# Patient Record
Sex: FEMALE | Race: WHITE | NOT HISPANIC OR LATINO | Employment: OTHER | URBAN - NONMETROPOLITAN AREA
[De-identification: names, ages, dates, MRNs, and addresses within clinical notes are randomized per-mention and may not be internally consistent; named-entity substitution may affect disease eponyms.]

---

## 2019-01-01 ENCOUNTER — TRANSFERRED RECORDS (OUTPATIENT)
Dept: HEALTH INFORMATION MANAGEMENT | Facility: HOSPITAL | Age: 64
End: 2019-01-01

## 2021-08-31 NOTE — PROGRESS NOTES
"    Assessment & Plan     1. Encounter to establish care  Will obtain records    2. Hypothyroidism due to acquired atrophy of thyroid  - TSH with free T4 reflex; Future    3. Hyperlipidemia LDL goal <100  - Lipid Profile; Future  - Comprehensive metabolic panel; Future    4. Restless legs syndrome  - clonazePAM (KLONOPIN) 0.5 MG tablet; Take 1 tablet (0.5 mg) by mouth nightly as needed (restless legs)  Dispense: 30 tablet; Refill: 3      Return in about 3 months (around 12/1/2021) for thyroid, lipids.    Zoë Romero, NP  [facility] - [place] is a 65 year old who presents for the following health issues     HPI       New Patient/Transfer of Care    No records for review.  States has been on zocor many years, due for labs, tolerating well.      synthroid - also on for several years - dose was adjusted last spring - due for recheck.    Restless legs, take 0.5mg klonopin at bedtime with good relief - due for refill.      She has been taking estradiol and progesterone for severe hot flashes - working well.      Mammogram current, is not sure of vaccines but has had covid vaccine per report.      She has completed release of information, will review notes when received.     Review of Systems   Constitutional, HEENT, cardiovascular, pulmonary, gi and gu systems are negative, except as otherwise noted.      Objective    /66 (BP Location: Right arm, Patient Position: Sitting, Cuff Size: Adult Regular)   Pulse 64   Temp 97.7  F (36.5  C) (Tympanic)   Resp 16   Ht 1.715 m (5' 7.5\")   Wt 58.5 kg (129 lb)   SpO2 98%   BMI 19.91 kg/m    Body mass index is 19.91 kg/m .  Physical Exam   GENERAL: healthy, alert and no distress  NECK: no adenopathy, no asymmetry, masses, or scars, thyroid normal to palpation and no carotid bruits  RESP: lungs clear to auscultation - no rales, rhonchi or wheezes  CV: regular rate and rhythm, normal S1 S2, no S3 or S4, no murmur, click or " rub, no peripheral edema and peripheral pulses strong  MS: no gross musculoskeletal defects noted, no edema  PSYCH: mentation appears normal, affect normal/bright

## 2021-09-01 ENCOUNTER — OFFICE VISIT (OUTPATIENT)
Dept: FAMILY MEDICINE | Facility: OTHER | Age: 66
End: 2021-09-01
Attending: NURSE PRACTITIONER
Payer: COMMERCIAL

## 2021-09-01 VITALS
HEIGHT: 68 IN | DIASTOLIC BLOOD PRESSURE: 66 MMHG | WEIGHT: 129 LBS | OXYGEN SATURATION: 98 % | SYSTOLIC BLOOD PRESSURE: 104 MMHG | HEART RATE: 64 BPM | BODY MASS INDEX: 19.55 KG/M2 | RESPIRATION RATE: 16 BRPM | TEMPERATURE: 97.7 F

## 2021-09-01 DIAGNOSIS — Z76.89 ENCOUNTER TO ESTABLISH CARE: Primary | ICD-10-CM

## 2021-09-01 DIAGNOSIS — G25.81 RESTLESS LEGS SYNDROME: ICD-10-CM

## 2021-09-01 DIAGNOSIS — E03.4 HYPOTHYROIDISM DUE TO ACQUIRED ATROPHY OF THYROID: ICD-10-CM

## 2021-09-01 DIAGNOSIS — N95.1 MENOPAUSAL SYNDROME (HOT FLASHES): ICD-10-CM

## 2021-09-01 DIAGNOSIS — E78.5 HYPERLIPIDEMIA LDL GOAL <100: ICD-10-CM

## 2021-09-01 LAB
ALBUMIN SERPL-MCNC: 3.9 G/DL (ref 3.4–5)
ALP SERPL-CCNC: 50 U/L (ref 40–150)
ALT SERPL W P-5'-P-CCNC: 26 U/L (ref 0–50)
ANION GAP SERPL CALCULATED.3IONS-SCNC: 6 MMOL/L (ref 3–14)
AST SERPL W P-5'-P-CCNC: 19 U/L (ref 0–45)
BILIRUB SERPL-MCNC: 0.7 MG/DL (ref 0.2–1.3)
BUN SERPL-MCNC: 22 MG/DL (ref 7–30)
CALCIUM SERPL-MCNC: 9.3 MG/DL (ref 8.5–10.1)
CHLORIDE BLD-SCNC: 106 MMOL/L (ref 94–109)
CHOLEST SERPL-MCNC: 177 MG/DL
CO2 SERPL-SCNC: 29 MMOL/L (ref 20–32)
CREAT SERPL-MCNC: 1.05 MG/DL (ref 0.52–1.04)
FASTING STATUS PATIENT QL REPORTED: NO
GFR SERPL CREATININE-BSD FRML MDRD: 57 ML/MIN/1.73M2
GLUCOSE BLD-MCNC: 88 MG/DL (ref 70–99)
HDLC SERPL-MCNC: 66 MG/DL
HOLD SPECIMEN: NORMAL
LDLC SERPL CALC-MCNC: 81 MG/DL
NONHDLC SERPL-MCNC: 111 MG/DL
POTASSIUM BLD-SCNC: 3.7 MMOL/L (ref 3.4–5.3)
PROT SERPL-MCNC: 7 G/DL (ref 6.8–8.8)
SODIUM SERPL-SCNC: 141 MMOL/L (ref 133–144)
TRIGL SERPL-MCNC: 150 MG/DL
TSH SERPL DL<=0.005 MIU/L-ACNC: 1.51 MU/L (ref 0.4–4)

## 2021-09-01 PROCEDURE — 80053 COMPREHEN METABOLIC PANEL: CPT | Performed by: NURSE PRACTITIONER

## 2021-09-01 PROCEDURE — 80061 LIPID PANEL: CPT | Performed by: NURSE PRACTITIONER

## 2021-09-01 PROCEDURE — 84443 ASSAY THYROID STIM HORMONE: CPT | Performed by: NURSE PRACTITIONER

## 2021-09-01 PROCEDURE — 99204 OFFICE O/P NEW MOD 45 MIN: CPT | Performed by: NURSE PRACTITIONER

## 2021-09-01 PROCEDURE — 36415 COLL VENOUS BLD VENIPUNCTURE: CPT | Performed by: NURSE PRACTITIONER

## 2021-09-01 RX ORDER — CLONAZEPAM 1 MG/1
TABLET ORAL
COMMUNITY
Start: 2021-03-11 | End: 2021-09-01

## 2021-09-01 RX ORDER — CLONAZEPAM 0.5 MG/1
0.5 TABLET ORAL
Qty: 30 TABLET | Refills: 3 | Status: SHIPPED | OUTPATIENT
Start: 2021-09-01 | End: 2021-12-22

## 2021-09-01 RX ORDER — LEVOTHYROXINE SODIUM 75 UG/1
TABLET ORAL
COMMUNITY
Start: 2021-07-23 | End: 2021-11-24

## 2021-09-01 RX ORDER — SIMVASTATIN 20 MG
TABLET ORAL
COMMUNITY
Start: 2021-08-16 | End: 2022-05-17

## 2021-09-01 RX ORDER — CLOTRIMAZOLE AND BETAMETHASONE DIPROPIONATE 10; .64 MG/G; MG/G
CREAM TOPICAL
COMMUNITY
Start: 2020-10-27

## 2021-09-01 RX ORDER — ESTRADIOL 1 MG/1
TABLET ORAL
COMMUNITY
Start: 2021-08-27 | End: 2021-11-24

## 2021-09-01 RX ORDER — PROGESTERONE 200 MG/1
CAPSULE ORAL
COMMUNITY
Start: 2021-08-27 | End: 2021-11-24

## 2021-09-01 ASSESSMENT — MIFFLIN-ST. JEOR: SCORE: 1180.7

## 2021-09-01 ASSESSMENT — PATIENT HEALTH QUESTIONNAIRE - PHQ9
SUM OF ALL RESPONSES TO PHQ QUESTIONS 1-9: 1
5. POOR APPETITE OR OVEREATING: SEVERAL DAYS

## 2021-09-01 ASSESSMENT — ANXIETY QUESTIONNAIRES
GAD7 TOTAL SCORE: 5
1. FEELING NERVOUS, ANXIOUS, OR ON EDGE: NOT AT ALL
6. BECOMING EASILY ANNOYED OR IRRITABLE: SEVERAL DAYS
7. FEELING AFRAID AS IF SOMETHING AWFUL MIGHT HAPPEN: NOT AT ALL
3. WORRYING TOO MUCH ABOUT DIFFERENT THINGS: SEVERAL DAYS
IF YOU CHECKED OFF ANY PROBLEMS ON THIS QUESTIONNAIRE, HOW DIFFICULT HAVE THESE PROBLEMS MADE IT FOR YOU TO DO YOUR WORK, TAKE CARE OF THINGS AT HOME, OR GET ALONG WITH OTHER PEOPLE: NOT DIFFICULT AT ALL
2. NOT BEING ABLE TO STOP OR CONTROL WORRYING: SEVERAL DAYS
5. BEING SO RESTLESS THAT IT IS HARD TO SIT STILL: SEVERAL DAYS

## 2021-09-01 ASSESSMENT — PAIN SCALES - GENERAL: PAINLEVEL: NO PAIN (0)

## 2021-09-01 NOTE — NURSING NOTE
"Chief Complaint   Patient presents with     Establish Care       Initial /66 (BP Location: Right arm, Patient Position: Sitting, Cuff Size: Adult Regular)   Pulse 64   Temp 97.7  F (36.5  C) (Tympanic)   Resp 16   Ht 1.715 m (5' 7.5\")   Wt 58.5 kg (129 lb)   SpO2 98%   BMI 19.91 kg/m   Estimated body mass index is 19.91 kg/m  as calculated from the following:    Height as of this encounter: 1.715 m (5' 7.5\").    Weight as of this encounter: 58.5 kg (129 lb).  Medication Reconciliation: complete  Shavon Christian LPN    "

## 2021-09-01 NOTE — PATIENT INSTRUCTIONS
Assessment & Plan     1. Encounter to establish care  Will obtain records    2. Hypothyroidism due to acquired atrophy of thyroid  - TSH with free T4 reflex; Future    3. Hyperlipidemia LDL goal <100  - Lipid Profile; Future  - Comprehensive metabolic panel; Future    4. Restless legs syndrome  - clonazePAM (KLONOPIN) 0.5 MG tablet; Take 1 tablet (0.5 mg) by mouth nightly as needed (restless legs)  Dispense: 30 tablet; Refill: 3      Return in about 3 months (around 12/1/2021) for thyroid, lipids.    Zoë Romero, NP  [facility]

## 2021-09-02 ASSESSMENT — ANXIETY QUESTIONNAIRES: GAD7 TOTAL SCORE: 5

## 2021-09-07 ENCOUNTER — DOCUMENTATION ONLY (OUTPATIENT)
Dept: OTHER | Facility: CLINIC | Age: 66
End: 2021-09-07

## 2021-10-17 ENCOUNTER — HEALTH MAINTENANCE LETTER (OUTPATIENT)
Age: 66
End: 2021-10-17

## 2021-11-15 ENCOUNTER — MYC MEDICAL ADVICE (OUTPATIENT)
Dept: FAMILY MEDICINE | Facility: OTHER | Age: 66
End: 2021-11-15
Payer: COMMERCIAL

## 2021-11-22 NOTE — PROGRESS NOTES
Assessment & Plan     1. Hyperlipidemia LDL goal <100  Elevated triglycerides, low fat diet, increase exercise.  Consider fish oil - 2-3 per day.     2. Hypothyroidism due to acquired atrophy of thyroid  - levothyroxine (SYNTHROID/LEVOTHROID) 75 MCG tablet; Take 1 tablet (75 mcg) by mouth daily  Dispense: 90 tablet; Refill: 1    3. Menopausal syndrome (hot flashes)  - estradiol (ESTRACE) 1 MG tablet; Take 1 tablet (1 mg) by mouth daily  Dispense: 90 tablet; Refill: 1  - progesterone (PROMETRIUM) 200 MG capsule; Take 1 capsule (200 mg) by mouth daily  Dispense: 90 capsule; Refill: 1    4. Cervicalgia  - MR Cervical Spine w/o Contrast; Future      Review of the result(s) of each unique test - TSH, CMP, Lipids        No follow-ups on file.    Zoë Romero NP  [facility] - STEPAN Cisenros is a 65 year old who presents for the following health issues     HPI       Patient would like a skin check today       Hyperlipidemia Follow-Up      Are you regularly taking any medication or supplement to lower your cholesterol?   Yes- Zocor     Are you having muscle aches or other side effects that you think could be caused by your cholesterol lowering medication?  No    Hypothyroidism Follow-up      Since last visit, patient describes the following symptoms: Weight stable, no hair loss, no skin changes, no constipation, no loose stools      How many servings of fruits and vegetables do you eat daily?  0-1    On average, how many sweetened beverages do you drink each day (Examples: soda, juice, sweet tea, etc.  Do NOT count diet or artificially sweetened beverages)?   0    How many days per week do you exercise enough to make your heart beat faster? 3 or less    How many minutes a day do you exercise enough to make your heart beat faster? 9 or less    How many days per week do you miss taking your medication? 0    Concern - Lump in left axillary   Onset: noted this am   Description: looks  swollen    Intensity: discomfort   Progression of Symptoms:  same  Accompanying Signs & Symptoms: not sure if from booster shot   Previous history of similar problem: no  Precipitating factors:        Worsened by: nothing   Alleviating factors:        Improved by: nothing   Therapies tried and outcome: None      Colonoscopy was in 2019 - request for records has been sent.      Chronic neck pain:  Ongoing and progressing.  Has tried tylenol NSAIDS, no relief.     Patient Active Problem List   Diagnosis     Hypothyroidism due to acquired atrophy of thyroid     Hyperlipidemia LDL goal <100     Restless legs syndrome     Menopausal syndrome (hot flashes)     Past Surgical History:   Procedure Laterality Date     AS HYSTEROSCOPY, SURGICAL; W/ ENDOMETRIAL ABLATION, ANY METHOD       NECK SURGERY       SKIN SURGERY         Social History     Tobacco Use     Smoking status: Never Smoker     Smokeless tobacco: Never Used   Substance Use Topics     Alcohol use: Not Currently     Family History   Problem Relation Age of Onset     Dementia Mother      Cancer Mother      Cancer Father      Dementia Paternal Grandfather          Current Outpatient Medications   Medication Sig Dispense Refill     clonazePAM (KLONOPIN) 0.5 MG tablet Take 1 tablet (0.5 mg) by mouth nightly as needed (restless legs) 30 tablet 3     estradiol (ESTRACE) 1 MG tablet        levothyroxine (SYNTHROID/LEVOTHROID) 75 MCG tablet        progesterone (PROMETRIUM) 200 MG capsule        simvastatin (ZOCOR) 20 MG tablet        clotrimazole-betamethasone (LOTRISONE) 1-0.05 % external cream  (Patient not taking: Reported on 9/1/2021)       No Known Allergies  Recent Labs   Lab Test 09/01/21  1421   LDL 81   HDL 66   TRIG 150*   ALT 26   CR 1.05*   GFRESTIMATED 57*   POTASSIUM 3.7   TSH 1.51      BP Readings from Last 3 Encounters:   11/24/21 102/68   09/01/21 104/66    Wt Readings from Last 3 Encounters:   11/24/21 60.7 kg (133 lb 12.8 oz)   09/01/21 58.5 kg (129 lb)  "                     Review of Systems   Constitutional, HEENT, cardiovascular, pulmonary, gi and gu systems are negative, except as otherwise noted.      Objective    /68 (BP Location: Left arm, Patient Position: Chair, Cuff Size: Adult Regular)   Pulse 73   Temp 97.9  F (36.6  C) (Tympanic)   Resp 16   Ht 1.715 m (5' 7.5\")   Wt 60.7 kg (133 lb 12.8 oz)   SpO2 97%   BMI 20.65 kg/m    Body mass index is 20.65 kg/m .  Physical Exam   GENERAL: healthy, alert and no distress  EYES: Eyes grossly normal to inspection, PERRL and conjunctivae and sclerae normal  HENT: ear canals and TM's normal, nose and mouth without ulcers or lesions  NECK: no adenopathy, no asymmetry, masses, or scars and thyroid normal to palpation  RESP: lungs clear to auscultation - no rales, rhonchi or wheezes  CV: regular rate and rhythm, normal S1 S2, no S3 or S4, no murmur, click or rub, no peripheral edema and peripheral pulses strong  ABDOMEN: soft, nontender, no hepatosplenomegaly, no masses and bowel sounds normal  MS: no gross musculoskeletal defects noted, no edema  SKIN: no suspicious lesions or rashes  PSYCH: mentation appears normal, affect normal/bright    Office Visit on 09/01/2021   Component Date Value Ref Range Status     Cholesterol 09/01/2021 177  <200 mg/dL Final    Age 0-19 years  Desirable: <170 mg/dL  Borderline high:  170-199 mg/dl  High:            >199 mg/dl    Age 20 years and older  Desirable: <200 mg/dL     Triglycerides 09/01/2021 150* <150 mg/dL Final    0-9 years:  Normal:    Less than 75 mg/dL  Borderline high:  75-99 mg/dL  High:             Greater than or equal to 100 mg/dL    0-19 years:  Normal:    Less than 90 mg/dL  Borderline high:   mg/dL  High:             Greater than or equal to 130 mg/dL    20 years and older:  Normal:    Less than 150 mg/dL  Borderline high:  150-199 mg/dL  High:             200-499 mg/dL  Very high:   Greater than or equal to 500 mg/dL     Direct Measure HDL " 09/01/2021 66  >=50 mg/dL Final    0-19 years:       Greater than or equal to 45 mg/dL   Low: Less than 40 mg/dL   Borderline low: 40-44 mg/dL     20 years and older:   Female: Greater than or equal to 50 mg/dL   Male:   Greater than or equal to 40 mg/dL          LDL Cholesterol Calculated 09/01/2021 81  <=100 mg/dL Final    Age 0-19 years:  Desirable: 0-110 mg/dL   Borderline high: 110-129 mg/dL   High: >= 130 mg/dL    Age 20 years and older:  Desirable: <100mg/dL  Above desirable: 100-129 mg/dL   Borderline high: 130-159 mg/dL   High: 160-189 mg/dL   Very high: >= 190 mg/dL     Non HDL Cholesterol 09/01/2021 111  <130 mg/dL Final    0-19 years:  Desirable:          Less than 120 mg/dL  Borderline high:   120-144 mg/dL  High:                   Greater than or equal to 145 mg/dL    20 years and older:  Desirable:          130 mg/dL  Above Desirable: 130-159 mg/dL  Borderline high:   160-189 mg/dL  High:               190-219 mg/dL  Very high:     Greater than or equal to 220 mg/dL     Patient Fasting > 8hrs? 09/01/2021 No   Final     Sodium 09/01/2021 141  133 - 144 mmol/L Final     Potassium 09/01/2021 3.7  3.4 - 5.3 mmol/L Final     Chloride 09/01/2021 106  94 - 109 mmol/L Final     Carbon Dioxide (CO2) 09/01/2021 29  20 - 32 mmol/L Final     Anion Gap 09/01/2021 6  3 - 14 mmol/L Final     Urea Nitrogen 09/01/2021 22  7 - 30 mg/dL Final     Creatinine 09/01/2021 1.05* 0.52 - 1.04 mg/dL Final     Calcium 09/01/2021 9.3  8.5 - 10.1 mg/dL Final     Glucose 09/01/2021 88  70 - 99 mg/dL Final     Alkaline Phosphatase 09/01/2021 50  40 - 150 U/L Final     AST 09/01/2021 19  0 - 45 U/L Final     ALT 09/01/2021 26  0 - 50 U/L Final     Protein Total 09/01/2021 7.0  6.8 - 8.8 g/dL Final     Albumin 09/01/2021 3.9  3.4 - 5.0 g/dL Final     Bilirubin Total 09/01/2021 0.7  0.2 - 1.3 mg/dL Final     GFR Estimate 09/01/2021 57* >60 mL/min/1.73m2 Final    As of July 11, 2021, eGFR is calculated by the CKD-EPI creatinine  equation, without race adjustment. eGFR can be influenced by muscle mass, exercise, and diet. The reported eGFR is an estimation only and is only applicable if the renal function is stable.     TSH 09/01/2021 1.51  0.40 - 4.00 mU/L Final     Hold Specimen 09/01/2021 [facility]   Final

## 2021-11-24 ENCOUNTER — OFFICE VISIT (OUTPATIENT)
Dept: FAMILY MEDICINE | Facility: OTHER | Age: 66
End: 2021-11-24
Attending: NURSE PRACTITIONER
Payer: COMMERCIAL

## 2021-11-24 VITALS
HEART RATE: 73 BPM | DIASTOLIC BLOOD PRESSURE: 68 MMHG | TEMPERATURE: 97.9 F | RESPIRATION RATE: 16 BRPM | HEIGHT: 68 IN | SYSTOLIC BLOOD PRESSURE: 102 MMHG | WEIGHT: 133.8 LBS | BODY MASS INDEX: 20.28 KG/M2 | OXYGEN SATURATION: 97 %

## 2021-11-24 DIAGNOSIS — N95.1 MENOPAUSAL SYNDROME (HOT FLASHES): ICD-10-CM

## 2021-11-24 DIAGNOSIS — E03.4 HYPOTHYROIDISM DUE TO ACQUIRED ATROPHY OF THYROID: ICD-10-CM

## 2021-11-24 DIAGNOSIS — M54.2 CERVICALGIA: ICD-10-CM

## 2021-11-24 DIAGNOSIS — E78.5 HYPERLIPIDEMIA LDL GOAL <100: Primary | ICD-10-CM

## 2021-11-24 PROCEDURE — 99214 OFFICE O/P EST MOD 30 MIN: CPT | Performed by: NURSE PRACTITIONER

## 2021-11-24 RX ORDER — PROGESTERONE 200 MG/1
200 CAPSULE ORAL DAILY
Qty: 90 CAPSULE | Refills: 1 | Status: SHIPPED | OUTPATIENT
Start: 2021-11-24 | End: 2022-05-19

## 2021-11-24 RX ORDER — ESTRADIOL 1 MG/1
1 TABLET ORAL DAILY
Qty: 90 TABLET | Refills: 1 | Status: SHIPPED | OUTPATIENT
Start: 2021-11-24 | End: 2022-05-19

## 2021-11-24 RX ORDER — LEVOTHYROXINE SODIUM 75 UG/1
75 TABLET ORAL DAILY
Qty: 90 TABLET | Refills: 1 | Status: SHIPPED | OUTPATIENT
Start: 2021-11-24 | End: 2022-06-27

## 2021-11-24 ASSESSMENT — PAIN SCALES - GENERAL: PAINLEVEL: NO PAIN (1)

## 2021-11-24 ASSESSMENT — MIFFLIN-ST. JEOR: SCORE: 1202.47

## 2021-11-24 NOTE — NURSING NOTE
"Chief Complaint   Patient presents with     Gyn Exam     Lipids     Thyroid Problem       Initial /68 (BP Location: Left arm, Patient Position: Chair, Cuff Size: Adult Regular)   Pulse 73   Temp 97.9  F (36.6  C) (Tympanic)   Resp 16   Ht 1.715 m (5' 7.5\")   Wt 60.7 kg (133 lb 12.8 oz)   SpO2 97%   BMI 20.65 kg/m   Estimated body mass index is 20.65 kg/m  as calculated from the following:    Height as of this encounter: 1.715 m (5' 7.5\").    Weight as of this encounter: 60.7 kg (133 lb 12.8 oz).  Medication Reconciliation: complete  Pamela M. Lechevalier, LPN    "

## 2021-12-08 ENCOUNTER — HOSPITAL ENCOUNTER (OUTPATIENT)
Dept: MRI IMAGING | Facility: HOSPITAL | Age: 66
Discharge: HOME | End: 2021-12-08
Attending: NURSE PRACTITIONER | Admitting: NURSE PRACTITIONER
Payer: COMMERCIAL

## 2021-12-08 DIAGNOSIS — M54.2 CERVICALGIA: ICD-10-CM

## 2021-12-08 PROCEDURE — 72141 MRI NECK SPINE W/O DYE: CPT

## 2021-12-11 ENCOUNTER — MYC MEDICAL ADVICE (OUTPATIENT)
Dept: FAMILY MEDICINE | Facility: OTHER | Age: 66
End: 2021-12-11
Payer: COMMERCIAL

## 2021-12-11 DIAGNOSIS — M54.2 CERVICALGIA: Primary | ICD-10-CM

## 2021-12-11 DIAGNOSIS — R93.7 ABNORMAL MRI, CERVICAL SPINE: ICD-10-CM

## 2021-12-13 NOTE — TELEPHONE ENCOUNTER
IMPRESSION:   Moderate bilateral foraminal stenosis at C4-5 secondary to disc and  endplate degenerative changes.     Postoperative changes at C5-C7 without acute complication.     Mild/moderate moderate degenerative changes of the cervical spine.      SILVINO KRUSE MD

## 2021-12-22 ENCOUNTER — OFFICE VISIT (OUTPATIENT)
Dept: FAMILY MEDICINE | Facility: OTHER | Age: 66
End: 2021-12-22
Attending: NURSE PRACTITIONER
Payer: COMMERCIAL

## 2021-12-22 VITALS
OXYGEN SATURATION: 98 % | RESPIRATION RATE: 18 BRPM | WEIGHT: 130 LBS | DIASTOLIC BLOOD PRESSURE: 70 MMHG | HEART RATE: 62 BPM | BODY MASS INDEX: 20.06 KG/M2 | SYSTOLIC BLOOD PRESSURE: 105 MMHG

## 2021-12-22 DIAGNOSIS — M54.10 RADICULAR PAIN OF UPPER EXTREMITY: ICD-10-CM

## 2021-12-22 DIAGNOSIS — M54.2 CERVICALGIA: Primary | ICD-10-CM

## 2021-12-22 DIAGNOSIS — G25.81 RESTLESS LEGS SYNDROME: ICD-10-CM

## 2021-12-22 PROCEDURE — 99214 OFFICE O/P EST MOD 30 MIN: CPT | Performed by: NURSE PRACTITIONER

## 2021-12-22 RX ORDER — CLONAZEPAM 0.5 MG/1
0.5 TABLET ORAL
Qty: 90 TABLET | Refills: 0 | Status: SHIPPED | OUTPATIENT
Start: 2021-12-22 | End: 2022-03-30

## 2021-12-22 ASSESSMENT — PAIN SCALES - GENERAL: PAINLEVEL: MILD PAIN (3)

## 2021-12-22 NOTE — NURSING NOTE
"Chief Complaint   Patient presents with     Musculoskeletal Problem       Initial /70 (BP Location: Right arm, Patient Position: Sitting, Cuff Size: Adult Regular)   Pulse 62   Resp 18   Wt 59 kg (130 lb)   SpO2 98%   BMI 20.06 kg/m   Estimated body mass index is 20.06 kg/m  as calculated from the following:    Height as of 11/24/21: 1.715 m (5' 7.5\").    Weight as of this encounter: 59 kg (130 lb).  Medication Reconciliation: complete  Batool Nelson LPN  "

## 2021-12-22 NOTE — PROGRESS NOTES
Assessment & Plan     1. Cervicalgia  - EMG; Future  - may consider injections after EMG    2. Radicular pain of upper extremity  - EMG; Future    3. Restless legs syndrome  - clonazePAM (KLONOPIN) 0.5 MG tablet; Take 1 tablet (0.5 mg) by mouth nightly as needed (restless legs)  Dispense: 90 tablet; Refill: 0       No follow-ups on file. will notify of EMG results as they are returned.      ARABELLA Castro    Patient is seen in conjunction with PA student.  History is reviewed with patient and pertinent portions of the exam are repeated.  Assessment and plan is reviewed with the patient.      Zoë Romero NP  [facility] - [place] Cisneros is a 65 year old who presents for the following health issues  accompanied by her spouse.    HPI     Pain History:  When did you first notice your pain? - More than 6 weeks   Have you seen this provider for your pain in the past?   Yes   Where in your body do you have pain? Wrist- right and left  Are you seeing anyone else for your pain? No    Describes pain as intermittent sharp wrist pain with occasional minor numbness and tingling  History of neck surgery in 2012 for collapsed disc    MRI done on 12/08/2021.    Appointment on Feb 17, 2022 with Dr. Babcock      PHQ-9 SCORE 9/1/2021   PHQ-9 Total Score 1       SLIM-7 SCORE 9/1/2021   Total Score 5     Chronic Pain Follow Up:    Location of pain: neck and bilateral wrist  Analgesia/pain control:    - Recent changes:  Worsening, occasional radiating pain into thumbs    - Overall control: Tolerable with discomfort    - Current treatments: aleve with occasional tylenol   Adherence:     - Do you ever take more pain medicine than prescribed? No    - When did you take your last dose of pain medicine?  NA   Adverse effects: No     PDMP Review     None        Last CSA Agreement:   CSA -- Patient Level:    CSA: None found at the patient level.       Review of Systems   Constitutional, HEENT,  cardiovascular, pulmonary, gi and gu systems are negative, except as otherwise noted.      Objective    /70 (BP Location: Right arm, Patient Position: Sitting, Cuff Size: Adult Regular)   Pulse 62   Resp 18   Wt 59 kg (130 lb)   SpO2 98%   BMI 20.06 kg/m    Body mass index is 20.06 kg/m .  Physical Exam   GENERAL: healthy, alert and no distress  MS: decreased range of motion in bilateral wrists  PSYCH: mentation appears normal, affect normal/bright  Narrative & Impression   PROCEDURE: MR CERVICAL SPINE W/O CONTRAST  12/8/2021 1:19 PM     HISTORY: Female, age 65 years, Cervical radiculopathy, prior C-spine  surgery; Cervicalgia     COMPARISON: None.     TECHNIQUE: Sagittal T1, proton density, T2 with fat saturation; axial  proton density and T2.     FINDINGS:  Postoperative changes are seen consistent with history of anterior and  interbody fusion at C5-C7.     There is chronic-appearing retrolisthesis of C4 relative to C5 with  slight mass effect upon the ventral aspect of the thecal sac. No  evidence of cord compression.     The atlantoaxial and occipital joints demonstrate minimal degenerative  change.     C2-3: Normal disc and endplates. Mild right facet joint degeneration.     C3-4: Mild disc degeneration. Minimal endplate and mild left facet  joint degenerative change.     C4-5: Severe disc and endplate degenerative changes with narrowing the  neural foramina and mild bilateral facet joint degeneration, slightly  worse on the left.     Postoperative changes are seen at C5-6 and C6-7 without acute  abnormality. Facet joints are well aligned at these levels and  demonstrate no evidence of inflammatory change or significant  degenerative change.     C7-T1: Very slight disc bulge. Minimal endplate and mild facet joint  degenerative changes, worse on the left.     Visualized portions of the thoracic spine demonstrates a small  posterior central disc protrusion at T2-3 and mild facet joint  degenerative  changes at the same level as well as at T3-4.                                                                      IMPRESSION:   Moderate bilateral foraminal stenosis at C4-5 secondary to disc and  endplate degenerative changes.     Postoperative changes at C5-C7 without acute complication.     Mild/moderate moderate degenerative changes of the cervical spine.      SILVINO KRUSE MD

## 2021-12-29 ENCOUNTER — MYC MEDICAL ADVICE (OUTPATIENT)
Dept: FAMILY MEDICINE | Facility: OTHER | Age: 66
End: 2021-12-29
Payer: COMMERCIAL

## 2022-01-05 NOTE — TELEPHONE ENCOUNTER
[facility] has referral, they are behind due to Holidays. Pt notified to call [phone] and schedule

## 2022-01-25 ENCOUNTER — MYC MEDICAL ADVICE (OUTPATIENT)
Dept: FAMILY MEDICINE | Facility: OTHER | Age: 67
End: 2022-01-25
Payer: COMMERCIAL

## 2022-01-25 DIAGNOSIS — Z12.31 ENCOUNTER FOR SCREENING MAMMOGRAM FOR BREAST CANCER: Primary | ICD-10-CM

## 2022-01-27 ENCOUNTER — TRANSFERRED RECORDS (OUTPATIENT)
Dept: HEALTH INFORMATION MANAGEMENT | Facility: CLINIC | Age: 67
End: 2022-01-27

## 2022-02-09 ENCOUNTER — TELEPHONE (OUTPATIENT)
Dept: FAMILY MEDICINE | Facility: OTHER | Age: 67
End: 2022-02-09
Payer: COMMERCIAL

## 2022-02-09 NOTE — TELEPHONE ENCOUNTER
Call from patient reporting, positive for covid 19 at home test.     Symptoms to include: ypical head cold, sinus congestion and runny nose.     Inquiring on self isolation:    COVID 19 Nurse Triage Plan/Patient Instructions    Please be aware that novel coronavirus (COVID-19) may be circulating in the community. If you develop symptoms such as fever, cough, or SOB or if you have concerns about the presence of another infection including coronavirus (COVID-19), please contact your health care provider or visit [email or web address].     Disposition/Instructions    Home care recommended. Follow home care protocol based instructions.    Thank you for taking steps to prevent the spread of this virus.  o Limit your contact with others.  o Wear a simple mask to cover your cough.  o Wash your hands well and often.    Resources    [facility]: About COVID-19: [email or web address]    CDC: What to Do If You're Sick: www.cdc.gov/coronavirus/2019-ncov/about/steps-when-sick.html    CDC: Ending Home Isolation: www.cdc.gov/coronavirus/2019-ncov/hcp/disposition-in-home-patients.html     CDC: Caring for Someone: www.cdc.gov/coronavirus/2019-ncov/if-you-are-sick/care-for-someone.html     [facility]: Interim Guidance for Hospital Discharge to Home: www.health.[facility].[place].us/diseases/coronavirus/hcp/hospdischarge.pdf    [facility] clinical trials (COVID-19 research studies): [email or web address]-clinical-trials     Below are the COVID-19 hotlines at the [facility] ([facility]). Interpreters are available.   o For health questions: Call 049-892-4861 or 1-409.387.1048 (7 a.m. to 7 p.m.)  o For questions about schools and childcare: Call 085-311-9386 or 1-142.364.9983 (7 a.m. to 7 p.m.)

## 2022-02-17 ENCOUNTER — TRANSFERRED RECORDS (OUTPATIENT)
Dept: HEALTH INFORMATION MANAGEMENT | Facility: CLINIC | Age: 67
End: 2022-02-17

## 2022-03-10 ENCOUNTER — TRANSFERRED RECORDS (OUTPATIENT)
Dept: HEALTH INFORMATION MANAGEMENT | Facility: CLINIC | Age: 67
End: 2022-03-10

## 2022-03-30 ENCOUNTER — MYC REFILL (OUTPATIENT)
Dept: FAMILY MEDICINE | Facility: OTHER | Age: 67
End: 2022-03-30
Payer: COMMERCIAL

## 2022-03-30 DIAGNOSIS — G25.81 RESTLESS LEGS SYNDROME: ICD-10-CM

## 2022-03-31 RX ORDER — CLONAZEPAM 0.5 MG/1
0.5 TABLET ORAL
Qty: 90 TABLET | Refills: 0 | Status: SHIPPED | OUTPATIENT
Start: 2022-03-31 | End: 2022-06-27

## 2022-03-31 NOTE — TELEPHONE ENCOUNTER
Klonopin  Last Written Prescription Date: 12/22/21  Last Fill Quantity: 90 # of Refills: 0  Last Office Visit: 12/22/21

## 2022-05-19 ENCOUNTER — ALLIED HEALTH/NURSE VISIT (OUTPATIENT)
Dept: FAMILY MEDICINE | Facility: OTHER | Age: 67
End: 2022-05-19
Attending: NURSE PRACTITIONER
Payer: COMMERCIAL

## 2022-05-19 DIAGNOSIS — N95.1 MENOPAUSAL SYNDROME (HOT FLASHES): ICD-10-CM

## 2022-05-19 RX ORDER — ESTRADIOL 1 MG/1
1 TABLET ORAL DAILY
Qty: 90 TABLET | Refills: 1 | Status: SHIPPED | OUTPATIENT
Start: 2022-05-19 | End: 2022-08-22

## 2022-05-19 RX ORDER — PROGESTERONE 200 MG/1
200 CAPSULE ORAL DAILY
Qty: 90 CAPSULE | Refills: 1 | Status: SHIPPED | OUTPATIENT
Start: 2022-05-19 | End: 2022-08-22

## 2022-06-27 ENCOUNTER — MYC MEDICAL ADVICE (OUTPATIENT)
Dept: FAMILY MEDICINE | Facility: OTHER | Age: 67
End: 2022-06-27

## 2022-06-27 DIAGNOSIS — E03.4 HYPOTHYROIDISM DUE TO ACQUIRED ATROPHY OF THYROID: ICD-10-CM

## 2022-06-27 RX ORDER — LEVOTHYROXINE SODIUM 75 UG/1
75 TABLET ORAL DAILY
Qty: 90 TABLET | Refills: 1 | Status: SHIPPED | OUTPATIENT
Start: 2022-06-27 | End: 2023-01-04

## 2022-07-10 NOTE — PROGRESS NOTES
Assessment & Plan     1. Hyperlipidemia LDL goal <100  Continue zocor  - Comprehensive metabolic panel; Future  - Lipid Profile; Future    2. Hypothyroidism due to acquired atrophy of thyroid  Continue current plan  - TSH with free T4 reflex; Future    3. Pain of right heel  Consider referral to podiatry   - XR FOOT RT G/E 3 VW (Clinic Performed); Future    4. On statin therapy  - Lipid Profile; Future,ed          Return in about 6 months (around 1/20/2023) for thyroid, lipids.    Zoë Romero NP  [facility] - STEPAN Cisneros is a 66 year old, presenting for the following health issues:  Lipids and Thyroid Problem      HPI     Hyperlipidemia Follow-Up      Are you regularly taking any medication or supplement to lower your cholesterol?   Yes- Zocor     Are you having muscle aches or other side effects that you think could be caused by your cholesterol lowering medication?  No    Hypothyroidism Follow-up      Since last visit, patient describes the following symptoms: Weight stable, no hair loss, no skin changes, no constipation, no loose stools      How many servings of fruits and vegetables do you eat daily?  0-1    On average, how many sweetened beverages do you drink each day (Examples: soda, juice, sweet tea, etc.  Do NOT count diet or artificially sweetened beverages)?   1    How many days per week do you exercise enough to make your heart beat faster? 5    How many minutes a day do you exercise enough to make your heart beat faster? 30 - 60    How many days per week do you miss taking your medication? 0    Notes intermittent stabbing pain of right heel.  Has not happened for a few weeks but is worried that it will return.      Recent Labs   Lab Test 09/01/21  1421   LDL 81   HDL 66   TRIG 150*   ALT 26   CR 1.05*   GFRESTIMATED 57*   POTASSIUM 3.7   TSH 1.51      BP Readings from Last 3 Encounters:   07/20/22 102/64   12/22/21 105/70   11/24/21 102/68    Wt Readings  "from Last 3 Encounters:   07/20/22 62.3 kg (137 lb 4.8 oz)   12/22/21 59 kg (130 lb)   11/24/21 60.7 kg (133 lb 12.8 oz)                      Review of Systems   Constitutional, HEENT, cardiovascular, pulmonary, gi and gu systems are negative, except as otherwise noted.      Objective    /64 (BP Location: Left arm, Patient Position: Chair, Cuff Size: Adult Regular)   Pulse 66   Temp 98.4  F (36.9  C) (Tympanic)   Resp 16   Ht 1.715 m (5' 7.5\")   Wt 62.3 kg (137 lb 4.8 oz)   SpO2 97%   BMI 21.19 kg/m    Body mass index is 21.19 kg/m .  Physical Exam   GENERAL: healthy, alert and no distress  RESP: lungs clear to auscultation - no rales, rhonchi or wheezes  CV: regular rate and rhythm, normal S1 S2, no S3 or S4, no murmur, click or rub, no peripheral edema and peripheral pulses strong  MS: no gross musculoskeletal defects noted, no edema  PSYCH: mentation appears normal, affect normal/bright                  .  ..  "

## 2022-07-20 ENCOUNTER — OFFICE VISIT (OUTPATIENT)
Dept: FAMILY MEDICINE | Facility: OTHER | Age: 67
End: 2022-07-20
Attending: NURSE PRACTITIONER
Payer: COMMERCIAL

## 2022-07-20 VITALS
HEIGHT: 68 IN | DIASTOLIC BLOOD PRESSURE: 64 MMHG | RESPIRATION RATE: 16 BRPM | SYSTOLIC BLOOD PRESSURE: 102 MMHG | WEIGHT: 137.3 LBS | TEMPERATURE: 98.4 F | HEART RATE: 66 BPM | BODY MASS INDEX: 20.81 KG/M2 | OXYGEN SATURATION: 97 %

## 2022-07-20 DIAGNOSIS — Z79.899 ON STATIN THERAPY: ICD-10-CM

## 2022-07-20 DIAGNOSIS — E03.4 HYPOTHYROIDISM DUE TO ACQUIRED ATROPHY OF THYROID: ICD-10-CM

## 2022-07-20 DIAGNOSIS — E78.5 HYPERLIPIDEMIA LDL GOAL <100: Primary | ICD-10-CM

## 2022-07-20 DIAGNOSIS — M79.671 PAIN OF RIGHT HEEL: ICD-10-CM

## 2022-07-20 LAB
ALBUMIN SERPL-MCNC: 3.7 G/DL (ref 3.4–5)
ALP SERPL-CCNC: 55 U/L (ref 40–150)
ALT SERPL W P-5'-P-CCNC: 19 U/L (ref 0–50)
ANION GAP SERPL CALCULATED.3IONS-SCNC: 6 MMOL/L (ref 3–14)
AST SERPL W P-5'-P-CCNC: 18 U/L (ref 0–45)
BILIRUB SERPL-MCNC: 0.3 MG/DL (ref 0.2–1.3)
BUN SERPL-MCNC: 26 MG/DL (ref 7–30)
CALCIUM SERPL-MCNC: 8.9 MG/DL (ref 8.5–10.1)
CHLORIDE BLD-SCNC: 106 MMOL/L (ref 94–109)
CHOLEST SERPL-MCNC: 186 MG/DL
CO2 SERPL-SCNC: 27 MMOL/L (ref 20–32)
CREAT SERPL-MCNC: 0.85 MG/DL (ref 0.52–1.04)
FASTING STATUS PATIENT QL REPORTED: NO
GFR SERPL CREATININE-BSD FRML MDRD: 76 ML/MIN/1.73M2
GLUCOSE BLD-MCNC: 121 MG/DL (ref 70–99)
HDLC SERPL-MCNC: 61 MG/DL
LDLC SERPL CALC-MCNC: 70 MG/DL
NONHDLC SERPL-MCNC: 125 MG/DL
POTASSIUM BLD-SCNC: 3.8 MMOL/L (ref 3.4–5.3)
PROT SERPL-MCNC: 7.3 G/DL (ref 6.8–8.8)
SODIUM SERPL-SCNC: 139 MMOL/L (ref 133–144)
TRIGL SERPL-MCNC: 273 MG/DL
TSH SERPL DL<=0.005 MIU/L-ACNC: 0.96 MU/L (ref 0.4–4)

## 2022-07-20 PROCEDURE — 80053 COMPREHEN METABOLIC PANEL: CPT | Performed by: NURSE PRACTITIONER

## 2022-07-20 PROCEDURE — 99214 OFFICE O/P EST MOD 30 MIN: CPT | Performed by: NURSE PRACTITIONER

## 2022-07-20 PROCEDURE — 36415 COLL VENOUS BLD VENIPUNCTURE: CPT | Performed by: NURSE PRACTITIONER

## 2022-07-20 PROCEDURE — 84443 ASSAY THYROID STIM HORMONE: CPT | Performed by: NURSE PRACTITIONER

## 2022-07-20 PROCEDURE — 80061 LIPID PANEL: CPT | Performed by: NURSE PRACTITIONER

## 2022-07-20 ASSESSMENT — PAIN SCALES - GENERAL: PAINLEVEL: NO PAIN (0)

## 2022-07-20 NOTE — NURSING NOTE
"  Chief Complaint   Patient presents with     Lipids     Thyroid Problem       Initial /64 (BP Location: Left arm, Patient Position: Chair, Cuff Size: Adult Regular)   Pulse 66   Temp 98.4  F (36.9  C) (Tympanic)   Resp 16   Ht 1.715 m (5' 7.5\")   Wt 62.3 kg (137 lb 4.8 oz)   SpO2 97%   BMI 21.19 kg/m   Estimated body mass index is 21.19 kg/m  as calculated from the following:    Height as of this encounter: 1.715 m (5' 7.5\").    Weight as of this encounter: 62.3 kg (137 lb 4.8 oz).  Medication Reconciliation: complete  Pamela M. Lechevalier, LPN    "

## 2022-07-20 NOTE — PATIENT INSTRUCTIONS
Assessment & Plan     1. Hyperlipidemia LDL goal <100  Continue zocor  - Comprehensive metabolic panel; Future  - Lipid Profile; Future    2. Hypothyroidism due to acquired atrophy of thyroid  Continue current plan  - TSH with free T4 reflex; Future    3. Pain of right heel  Consider referral to podiatry   - XR FOOT RT G/E 3 VW (Clinic Performed); Future    4. On statin therapy  - Lipid Profile; Future,ed          Return in about 6 months (around 1/20/2023) for thyroid, lipids.    Zoë Romero, NP  [facility] - [place]

## 2022-07-21 ENCOUNTER — ANCILLARY PROCEDURE (OUTPATIENT)
Dept: GENERAL RADIOLOGY | Facility: OTHER | Age: 67
End: 2022-07-21
Attending: NURSE PRACTITIONER
Payer: COMMERCIAL

## 2022-07-21 DIAGNOSIS — M79.671 PAIN OF RIGHT HEEL: ICD-10-CM

## 2022-07-21 PROCEDURE — 73630 X-RAY EXAM OF FOOT: CPT | Mod: TC | Performed by: RADIOLOGY

## 2022-08-17 DIAGNOSIS — E78.5 HYPERLIPIDEMIA LDL GOAL <100: ICD-10-CM

## 2022-08-17 DIAGNOSIS — N95.1 MENOPAUSAL SYNDROME (HOT FLASHES): ICD-10-CM

## 2022-08-20 NOTE — TELEPHONE ENCOUNTER
Estradiol      Last Written Prescription Date:  5/19/22  Last Fill Quantity: 90,   # refills: 1  Last Office Visit: 7/20/22  Future Office visit:       Routing refill request to provider for review/approval because:      Progesterone      Last Written Prescription Date:  5/19/22  Last Fill Quantity: 90,   # refills: 1  Last Office Visit: 7/20/22  Future Office visit:       Routing refill request to provider for review/approval because:      Simvastatin      Last Written Prescription Date:  5/19/22  Last Fill Quantity: 90,   # refills: 1  Last Office Visit: 7/20/22  Future Office visit:       Routing refill request to provider for review/approval because:

## 2022-08-22 RX ORDER — ESTRADIOL 1 MG/1
TABLET ORAL
Qty: 90 TABLET | Refills: 1 | Status: SHIPPED | OUTPATIENT
Start: 2022-08-22 | End: 2022-11-07

## 2022-08-22 RX ORDER — SIMVASTATIN 20 MG
TABLET ORAL
Qty: 90 TABLET | Refills: 1 | Status: SHIPPED | OUTPATIENT
Start: 2022-08-22 | End: 2022-11-07

## 2022-08-22 RX ORDER — PROGESTERONE 200 MG/1
CAPSULE ORAL
Qty: 90 CAPSULE | Refills: 1 | Status: SHIPPED | OUTPATIENT
Start: 2022-08-22 | End: 2022-11-07

## 2022-09-21 ENCOUNTER — MYC REFILL (OUTPATIENT)
Dept: FAMILY MEDICINE | Facility: OTHER | Age: 67
End: 2022-09-21

## 2022-09-21 DIAGNOSIS — G25.81 RESTLESS LEGS SYNDROME: ICD-10-CM

## 2022-09-22 RX ORDER — CLONAZEPAM 0.5 MG/1
0.5 TABLET ORAL
Qty: 90 TABLET | Refills: 0 | Status: SHIPPED | OUTPATIENT
Start: 2022-09-22 | End: 2022-12-15

## 2022-09-22 NOTE — TELEPHONE ENCOUNTER
clonazePAM      Last Written Prescription Date:  6/29/22  Last Fill Quantity: 90,   # refills: 0  Last Office Visit: 7/20/22  Future Office visit:       Routing refill request to provider for review/approval because:

## 2022-10-03 ENCOUNTER — HEALTH MAINTENANCE LETTER (OUTPATIENT)
Age: 67
End: 2022-10-03

## 2022-11-06 ENCOUNTER — MYC MEDICAL ADVICE (OUTPATIENT)
Dept: FAMILY MEDICINE | Facility: OTHER | Age: 67
End: 2022-11-06

## 2022-11-06 DIAGNOSIS — N95.1 MENOPAUSAL SYNDROME (HOT FLASHES): ICD-10-CM

## 2022-11-06 DIAGNOSIS — E78.5 HYPERLIPIDEMIA LDL GOAL <100: ICD-10-CM

## 2022-11-07 RX ORDER — PROGESTERONE 200 MG/1
CAPSULE ORAL
Qty: 90 CAPSULE | Refills: 1 | Status: SHIPPED | OUTPATIENT
Start: 2022-11-07 | End: 2023-05-15

## 2022-11-07 RX ORDER — SIMVASTATIN 20 MG
20 TABLET ORAL AT BEDTIME
Qty: 90 TABLET | Refills: 1 | Status: SHIPPED | OUTPATIENT
Start: 2022-11-07 | End: 2023-05-01

## 2022-11-07 RX ORDER — ESTRADIOL 1 MG/1
1 TABLET ORAL DAILY
Qty: 90 TABLET | Refills: 1 | Status: SHIPPED | OUTPATIENT
Start: 2022-11-07 | End: 2023-05-15

## 2022-12-15 ENCOUNTER — MYC REFILL (OUTPATIENT)
Dept: FAMILY MEDICINE | Facility: OTHER | Age: 67
End: 2022-12-15

## 2022-12-15 DIAGNOSIS — G25.81 RESTLESS LEGS SYNDROME: ICD-10-CM

## 2022-12-16 ENCOUNTER — MYC MEDICAL ADVICE (OUTPATIENT)
Dept: FAMILY MEDICINE | Facility: OTHER | Age: 67
End: 2022-12-16

## 2022-12-16 RX ORDER — CLONAZEPAM 0.5 MG/1
0.5 TABLET ORAL
Qty: 90 TABLET | Refills: 0 | Status: SHIPPED | OUTPATIENT
Start: 2022-12-16 | End: 2023-03-19

## 2022-12-16 NOTE — TELEPHONE ENCOUNTER
clonazePAM (KLONOPIN) 0.5 MG tablet    Last Written Prescription Date:  9-  Last Fill Quantity: 90,   # refills: 0  Last Office Visit: 7-  Future Office visit:    Next 5 appointments (look out 90 days)    Jan 25, 2023 11:00 AM  (Arrive by 10:45 AM)  SHORT with Zoë Roemro NP  [facility] ([facility] ) [address]  [facility] [address]  [phone]           Routing refill request to provider for review/approval because:  Drug not on the [facility], [facility] or [facility] refill protocol or controlled substance

## 2022-12-19 NOTE — TELEPHONE ENCOUNTER
Call placed to patient, notified pharmacy is open, patient may  rx from pharmacy.     Patient verbalized understanding, reports speaking with the pharmacy today, is awaiting now some insurance / billing issues.

## 2022-12-27 DIAGNOSIS — Z12.31 VISIT FOR SCREENING MAMMOGRAM: Primary | ICD-10-CM

## 2023-01-04 ENCOUNTER — MYC MEDICAL ADVICE (OUTPATIENT)
Dept: FAMILY MEDICINE | Facility: OTHER | Age: 68
End: 2023-01-04

## 2023-01-04 DIAGNOSIS — E03.4 HYPOTHYROIDISM DUE TO ACQUIRED ATROPHY OF THYROID: ICD-10-CM

## 2023-01-04 RX ORDER — LEVOTHYROXINE SODIUM 75 UG/1
75 TABLET ORAL DAILY
Qty: 90 TABLET | Refills: 1 | Status: SHIPPED | OUTPATIENT
Start: 2023-01-04 | End: 2023-07-03

## 2023-01-23 NOTE — PROGRESS NOTES
Assessment & Plan     1. Hyperlipidemia LDL goal <100  Continue zocor  - Lipid Profile; Future  - Comprehensive metabolic panel; Future    2. Hypothyroidism due to acquired atrophy of thyroid  Continue levothyroxine   - TSH with free T4 reflex; Future    3. Need for hepatitis C screening test  - Hepatitis C Screen Reflex to HCV RNA Quant and Genotype; Future    4. On statin therapy  - Comprehensive metabolic panel; Future    5. Need for vaccination  - Pneumococcal 20 Valent Conjugate (PCV20)    6. Encounter for screening mammogram for breast cancer  - MA Screen Bilateral w/Ozzie; Future    7. Menopause  - DEXA HIP/PELVIS/SPINE - Future; Future    8. Actinic keratosis  Dr Oswald  - Adult Dermatology Referral; Future    9. History of basal cell cancer  - Adult Dermatology Referral; Future        Return in about 6 months (around 7/25/2023) for thyroid, lipids.    Zoë Romero NP  [facility] - [place] DENEEN Cisneros is a 67 year old presenting for the following health issues:  Hyperlipidemia and Thyroid Problem      HPI     Hyperlipidemia Follow-Up      Are you regularly taking any medication or supplement to lower your cholesterol?   Yes- simvastatin    Are you having muscle aches or other side effects that you think could be caused by your cholesterol lowering medication?  No    Hypothyroidism Follow-up      Since last visit, patient describes the following symptoms: Weight stable, no hair loss, no skin changes, no constipation, no loose stools      How many servings of fruits and vegetables do you eat daily?  0-1    On average, how many sweetened beverages do you drink each day (Examples: soda, juice, sweet tea, etc.  Do NOT count diet or artificially sweetened beverages)?   0    How many days per week do you exercise enough to make your heart beat faster? 7    How many minutes a day do you exercise enough to make your heart beat faster? 30 - 60    How many days per week do you miss  "taking your medication? 0    She is feeling well, no new concerns.     Recent Labs   Lab Test 07/20/22  1455 09/01/21  1421   LDL 70 81   HDL 61 66   TRIG 273* 150*   ALT 19 26   CR 0.85 1.05*   GFRESTIMATED 76 57*   POTASSIUM 3.8 3.7   TSH 0.96 1.51      BP Readings from Last 3 Encounters:   01/25/23 102/70   07/20/22 102/64   12/22/21 105/70    Wt Readings from Last 3 Encounters:   01/25/23 62.1 kg (137 lb)   07/20/22 62.3 kg (137 lb 4.8 oz)   12/22/21 59 kg (130 lb)                Review of Systems   Constitutional, HEENT, cardiovascular, pulmonary, gi and gu systems are negative, except as otherwise noted.      Objective    /70 (BP Location: Left arm, Patient Position: Sitting, Cuff Size: Adult Regular)   Pulse 80   Temp 97.5  F (36.4  C) (Tympanic)   Resp 12   Ht 1.715 m (5' 7.5\")   Wt 62.1 kg (137 lb)   SpO2 99%   BMI 21.14 kg/m    Body mass index is 21.14 kg/m .  Physical Exam   GENERAL: healthy, alert and no distress  NECK: no adenopathy, no asymmetry, masses, or scars, thyroid normal to palpation and no carotid bruits  RESP: lungs clear to auscultation - no rales, rhonchi or wheezes  CV: regular rate and rhythm, normal S1 S2, no S3 or S4, no murmur, click or rub, no peripheral edema and peripheral pulses strong  MS: no gross musculoskeletal defects noted, no edema  PSYCH: mentation appears normal, affect normal/bright                    "

## 2023-01-25 ENCOUNTER — OFFICE VISIT (OUTPATIENT)
Dept: FAMILY MEDICINE | Facility: OTHER | Age: 68
End: 2023-01-25
Attending: NURSE PRACTITIONER
Payer: MEDICARE

## 2023-01-25 VITALS
RESPIRATION RATE: 12 BRPM | DIASTOLIC BLOOD PRESSURE: 70 MMHG | WEIGHT: 137 LBS | HEIGHT: 68 IN | SYSTOLIC BLOOD PRESSURE: 102 MMHG | OXYGEN SATURATION: 99 % | BODY MASS INDEX: 20.76 KG/M2 | HEART RATE: 80 BPM | TEMPERATURE: 97.5 F

## 2023-01-25 DIAGNOSIS — Z12.31 ENCOUNTER FOR SCREENING MAMMOGRAM FOR BREAST CANCER: ICD-10-CM

## 2023-01-25 DIAGNOSIS — Z85.828 HISTORY OF BASAL CELL CANCER: ICD-10-CM

## 2023-01-25 DIAGNOSIS — Z23 NEED FOR VACCINATION: ICD-10-CM

## 2023-01-25 DIAGNOSIS — E78.5 HYPERLIPIDEMIA LDL GOAL <100: Primary | ICD-10-CM

## 2023-01-25 DIAGNOSIS — Z78.0 MENOPAUSE: ICD-10-CM

## 2023-01-25 DIAGNOSIS — Z79.899 ON STATIN THERAPY: ICD-10-CM

## 2023-01-25 DIAGNOSIS — E03.4 HYPOTHYROIDISM DUE TO ACQUIRED ATROPHY OF THYROID: ICD-10-CM

## 2023-01-25 DIAGNOSIS — L57.0 ACTINIC KERATOSIS: ICD-10-CM

## 2023-01-25 DIAGNOSIS — Z11.59 NEED FOR HEPATITIS C SCREENING TEST: ICD-10-CM

## 2023-01-25 LAB
ALBUMIN SERPL BCG-MCNC: 4.5 G/DL (ref 3.5–5.2)
ALP SERPL-CCNC: 50 U/L (ref 35–104)
ALT SERPL W P-5'-P-CCNC: 14 U/L (ref 10–35)
ANION GAP SERPL CALCULATED.3IONS-SCNC: 12 MMOL/L (ref 7–15)
AST SERPL W P-5'-P-CCNC: 25 U/L (ref 10–35)
BILIRUB SERPL-MCNC: 0.6 MG/DL
BUN SERPL-MCNC: 20.6 MG/DL (ref 8–23)
CALCIUM SERPL-MCNC: 9.5 MG/DL (ref 8.8–10.2)
CHLORIDE SERPL-SCNC: 102 MMOL/L (ref 98–107)
CHOLEST SERPL-MCNC: 181 MG/DL
CREAT SERPL-MCNC: 0.88 MG/DL (ref 0.51–0.95)
DEPRECATED HCO3 PLAS-SCNC: 24 MMOL/L (ref 22–29)
GFR SERPL CREATININE-BSD FRML MDRD: 73 ML/MIN/1.73M2
GLUCOSE SERPL-MCNC: 86 MG/DL (ref 70–99)
HDLC SERPL-MCNC: 64 MG/DL
HOLD SPECIMEN: NORMAL
LDLC SERPL CALC-MCNC: 72 MG/DL
NONHDLC SERPL-MCNC: 117 MG/DL
POTASSIUM SERPL-SCNC: 4.2 MMOL/L (ref 3.4–5.3)
PROT SERPL-MCNC: 7.1 G/DL (ref 6.4–8.3)
SODIUM SERPL-SCNC: 138 MMOL/L (ref 136–145)
TRIGL SERPL-MCNC: 223 MG/DL
TSH SERPL DL<=0.005 MIU/L-ACNC: 1.07 UIU/ML (ref 0.3–4.2)

## 2023-01-25 PROCEDURE — G0463 HOSPITAL OUTPT CLINIC VISIT: HCPCS | Mod: 25 | Performed by: NURSE PRACTITIONER

## 2023-01-25 PROCEDURE — 84443 ASSAY THYROID STIM HORMONE: CPT | Mod: ZL | Performed by: NURSE PRACTITIONER

## 2023-01-25 PROCEDURE — 86803 HEPATITIS C AB TEST: CPT | Mod: ZL | Performed by: NURSE PRACTITIONER

## 2023-01-25 PROCEDURE — 36415 COLL VENOUS BLD VENIPUNCTURE: CPT | Mod: ZL | Performed by: NURSE PRACTITIONER

## 2023-01-25 PROCEDURE — 90677 PCV20 VACCINE IM: CPT

## 2023-01-25 PROCEDURE — 80061 LIPID PANEL: CPT | Mod: ZL | Performed by: NURSE PRACTITIONER

## 2023-01-25 PROCEDURE — 99214 OFFICE O/P EST MOD 30 MIN: CPT | Performed by: NURSE PRACTITIONER

## 2023-01-25 PROCEDURE — 80053 COMPREHEN METABOLIC PANEL: CPT | Mod: ZL | Performed by: NURSE PRACTITIONER

## 2023-01-25 NOTE — PATIENT INSTRUCTIONS
Assessment & Plan     1. Hyperlipidemia LDL goal <100  Continue zocor  - Lipid Profile; Future  - Comprehensive metabolic panel; Future    2. Hypothyroidism due to acquired atrophy of thyroid  Continue levothyroxine   - TSH with free T4 reflex; Future    3. Need for hepatitis C screening test  - Hepatitis C Screen Reflex to HCV RNA Quant and Genotype; Future    4. On statin therapy  - Comprehensive metabolic panel; Future    5. Need for vaccination  - Pneumococcal 20 Valent Conjugate (PCV20)    6. Encounter for screening mammogram for breast cancer  - MA Screen Bilateral w/Ozzie; Future    7. Menopause  - DEXA HIP/PELVIS/SPINE - Future; Future    8. Actinic keratosis  Dr Oswald  - Adult Dermatology Referral; Future    9. History of basal cell cancer  - Adult Dermatology Referral; Future        Return in about 6 months (around 7/25/2023) for thyroid, lipids.    Zoë Romero, NP  [facility]

## 2023-01-27 LAB — HCV AB SERPL QL IA: NONREACTIVE

## 2023-02-11 ENCOUNTER — HEALTH MAINTENANCE LETTER (OUTPATIENT)
Age: 68
End: 2023-02-11

## 2023-03-13 ENCOUNTER — TRANSFERRED RECORDS (OUTPATIENT)
Dept: HEALTH INFORMATION MANAGEMENT | Facility: CLINIC | Age: 68
End: 2023-03-13

## 2023-03-16 ENCOUNTER — TELEPHONE (OUTPATIENT)
Dept: FAMILY MEDICINE | Facility: OTHER | Age: 68
End: 2023-03-16

## 2023-03-19 ENCOUNTER — MYC REFILL (OUTPATIENT)
Dept: FAMILY MEDICINE | Facility: OTHER | Age: 68
End: 2023-03-19

## 2023-03-19 DIAGNOSIS — G25.81 RESTLESS LEGS SYNDROME: ICD-10-CM

## 2023-03-21 RX ORDER — CLONAZEPAM 0.5 MG/1
0.5 TABLET ORAL
Qty: 90 TABLET | Refills: 0 | Status: SHIPPED | OUTPATIENT
Start: 2023-03-21 | End: 2023-06-22

## 2023-03-21 NOTE — TELEPHONE ENCOUNTER
clonazePAM (KLONOPIN) 0.5 MG tablet      Last Written Prescription Date:  12-16-22  Last Fill Quantity: 90,   # refills: 0  Last Office Visit: 1-25-23  Future Office visit:       Routing refill request to provider for review/approval because:  Drug not on the [facility], [facility] or [facility] refill protocol or controlled substance

## 2023-04-06 ENCOUNTER — TELEPHONE (OUTPATIENT)
Dept: FAMILY MEDICINE | Facility: OTHER | Age: 68
End: 2023-04-06

## 2023-04-06 NOTE — TELEPHONE ENCOUNTER
Left message to call back for results of dexa showing osteopenia need to make sure taking adequate calcium and vit d.

## 2023-04-06 NOTE — TELEPHONE ENCOUNTER
Writer relayed dx of osteopenia & PCP recommendations for OTC Ca + Vit D  Writer did education of condition  Patient relayed understanding.  No further concerns at calls end.

## 2023-04-30 ENCOUNTER — MYC MEDICAL ADVICE (OUTPATIENT)
Dept: FAMILY MEDICINE | Facility: OTHER | Age: 68
End: 2023-04-30

## 2023-04-30 DIAGNOSIS — E78.5 HYPERLIPIDEMIA LDL GOAL <100: ICD-10-CM

## 2023-05-01 RX ORDER — SIMVASTATIN 20 MG
20 TABLET ORAL AT BEDTIME
Qty: 90 TABLET | Refills: 1 | Status: SHIPPED | OUTPATIENT
Start: 2023-05-01 | End: 2023-11-07

## 2023-05-02 ENCOUNTER — OFFICE VISIT (OUTPATIENT)
Dept: DERMATOLOGY | Facility: OTHER | Age: 68
End: 2023-05-02
Attending: DERMATOLOGY
Payer: MEDICARE

## 2023-05-02 VITALS
TEMPERATURE: 97.6 F | OXYGEN SATURATION: 100 % | DIASTOLIC BLOOD PRESSURE: 60 MMHG | RESPIRATION RATE: 16 BRPM | SYSTOLIC BLOOD PRESSURE: 122 MMHG | HEART RATE: 93 BPM

## 2023-05-02 DIAGNOSIS — Z85.828 HISTORY OF BASAL CELL CANCER: ICD-10-CM

## 2023-05-02 DIAGNOSIS — L57.0 ACTINIC KERATOSIS: ICD-10-CM

## 2023-05-02 PROCEDURE — 17110 DESTRUCTION B9 LES UP TO 14: CPT | Performed by: DERMATOLOGY

## 2023-05-02 PROCEDURE — 17000 DESTRUCT PREMALG LESION: CPT | Mod: 59

## 2023-05-02 PROCEDURE — 17000 DESTRUCT PREMALG LESION: CPT | Mod: 59 | Performed by: DERMATOLOGY

## 2023-05-02 PROCEDURE — G0463 HOSPITAL OUTPT CLINIC VISIT: HCPCS

## 2023-05-02 ASSESSMENT — PAIN SCALES - GENERAL: PAINLEVEL: NO PAIN (0)

## 2023-05-02 NOTE — PROGRESS NOTES
Visit Date: 2023    SUBJECTIVE:  First visit for Matthew, who states that she has a history of having two basal cells on her face quite a few years ago.  Also, had a very severe sunburn at one time.  Comes in because of precautionary reasons.  She saw her primary recently who noted a lesion on her ear that was thought to be an actinic keratosis.    OBJECTIVE:  Exam shows a healthy lady in no distress.  We checked her back, her face, her neck, face very carefully.  Minimal scarring from her previous surgery.  There was on the left ear, however, a classic actinic keratosis with some tiny bit of crusting, but no nodularity or evidence of tumor formation.  It is a small lesion that was treated with liquid nitrogen.  She also had a lesion on her left wrist area that was red.  It showed very minimal erosion.  It could be a very early basal cell.  I have treated this with liquid nitrogen.  We did not biopsy it.     ASSESSMENT:  See above for lesions noted.  History of basal cell.    PLAN:  Return in 1 year.    SHANNAN Oswald MD        D: 2023   T: 2023   MT: YOVANA    Name:     MATTHEW ARMENTA  MRN:      [id number]        Account:    [id number]   :      1955           Visit Date: 2023     Document: [id number]

## 2023-05-02 NOTE — LETTER
2023       RE: Matthew Armenta  [address] [facility] [address]     Dear Colleague,    Thank you for referring your patient, Matthew Armenta, to the [facility]. Please see a copy of my visit note below.    Visit Date: 2023    SUBJECTIVE:  First visit for Matthew, who states that she has a history of having two basal cells on her face quite a few years ago.  Also, had a very severe sunburn at one time.  Comes in because of precautionary reasons.  She saw her primary recently who noted a lesion on her ear that was thought to be an actinic keratosis.    OBJECTIVE:  Exam shows a healthy lady in no distress.  We checked her back, her face, her neck, face very carefully.  Minimal scarring from her previous surgery.  There was on the left ear, however, a classic actinic keratosis with some tiny bit of crusting, but no nodularity or evidence of tumor formation.  It is a small lesion that was treated with liquid nitrogen.  She also had a lesion on her left wrist area that was red.  It showed very minimal erosion.  It could be a very early basal cell.  I have treated this with liquid nitrogen.  We did not biopsy it.     ASSESSMENT:  See above for lesions noted.  History of basal cell.    PLAN:  Return in 1 year.    SHANNAN Oswald MD        D: 2023   T: 2023   MT: [facility]    Name:     MATTHEW ARMENTA  MRN:      [id number]        Account:    [id number]   :      1955           Visit Date: 2023     Document: [id number]      Again, thank you for allowing me to participate in the care of your patient.      Sincerely,    SHANNAN Oswald MD

## 2023-05-14 ENCOUNTER — MYC MEDICAL ADVICE (OUTPATIENT)
Dept: FAMILY MEDICINE | Facility: OTHER | Age: 68
End: 2023-05-14

## 2023-05-14 DIAGNOSIS — N95.1 MENOPAUSAL SYNDROME (HOT FLASHES): ICD-10-CM

## 2023-05-15 RX ORDER — ESTRADIOL 1 MG/1
1 TABLET ORAL DAILY
Qty: 90 TABLET | Refills: 1 | Status: SHIPPED | OUTPATIENT
Start: 2023-05-15 | End: 2023-11-07

## 2023-05-15 RX ORDER — PROGESTERONE 200 MG/1
CAPSULE ORAL
Qty: 90 CAPSULE | Refills: 1 | Status: SHIPPED | OUTPATIENT
Start: 2023-05-15 | End: 2023-11-07

## 2023-06-22 ENCOUNTER — MYC REFILL (OUTPATIENT)
Dept: FAMILY MEDICINE | Facility: OTHER | Age: 68
End: 2023-06-22

## 2023-06-22 DIAGNOSIS — G25.81 RESTLESS LEGS SYNDROME: ICD-10-CM

## 2023-06-26 RX ORDER — CLONAZEPAM 0.5 MG/1
0.5 TABLET ORAL
Qty: 90 TABLET | Refills: 0 | Status: SHIPPED | OUTPATIENT
Start: 2023-06-26 | End: 2023-09-16

## 2023-06-26 NOTE — TELEPHONE ENCOUNTER
Klonopin      Last Written Prescription Date:  3.21.23  Last Fill Quantity: #90,   # refills: 0  Last Office Visit: 1.25.23  Future Office visit:    Next 5 appointments (look out 90 days)    Jul 31, 2023 11:00 AM  (Arrive by 10:45 AM)  SHORT with Zoë Romero NP  [facility] ([facility] ) [address]  [facility] [address]  [phone]           Routing refill request to provider for review/approval because:  Drug not on the [facility], [facility] or  Health refill protocol or controlled substance

## 2023-07-03 ENCOUNTER — MYC MEDICAL ADVICE (OUTPATIENT)
Dept: FAMILY MEDICINE | Facility: OTHER | Age: 68
End: 2023-07-03

## 2023-07-03 DIAGNOSIS — E03.4 HYPOTHYROIDISM DUE TO ACQUIRED ATROPHY OF THYROID: ICD-10-CM

## 2023-07-03 RX ORDER — LEVOTHYROXINE SODIUM 75 UG/1
75 TABLET ORAL DAILY
Qty: 90 TABLET | Refills: 1 | Status: SHIPPED | OUTPATIENT
Start: 2023-07-03 | End: 2023-12-22

## 2023-07-24 NOTE — PROGRESS NOTES
Assessment & Plan     1. Hyperlipidemia LDL goal <100  Continue simvastatin   - Lipid Profile; Future  - Comprehensive metabolic panel; Future    2. Hypothyroidism due to acquired atrophy of thyroid  Continue levothyroxine   - TSH with free T4 reflex; Future  - CBC with Platelets & Differential; Future    3. Restless legs syndrome  Stable - continue klonopin as needed at bedtime    4. On statin therapy  - Comprehensive metabolic panel; Future    5. Dysphagia, unspecified type  EGD  - Adult General Surg Referral      Return in about 6 months (around 1/31/2024) for lipids.    Zoë Romero NP  [facility] - [place] DEN[place] Cisneros is a 67 year old, presenting for the following health issues:  Lipids and Thyroid Problem    HPI     Hyperlipidemia Follow-Up    Are you regularly taking any medication or supplement to lower your cholesterol?   Yes- Zocor 20 mg  Are you having muscle aches or other side effects that you think could be caused by your cholesterol lowering medication?  No    Hypothyroidism Follow-up    Since last visit, patient describes the following symptoms: Weight stable, no hair loss, no skin changes, no constipation, no loose stools      She notes increased trouble swallowing, feels like foods get stuck in throat.  She has not had an EGD.  Restless legs, klonopin has been effective.      Recent Labs   Lab Test 01/25/23  1202 07/20/22  1455 09/01/21  1421   LDL 72 70 81   HDL 64 61 66   TRIG 223* 273* 150*   ALT 14 19 26   CR 0.88 0.85 1.05*   GFRESTIMATED 73 76 57*   POTASSIUM 4.2 3.8 3.7   TSH 1.07 0.96 1.51      BP Readings from Last 3 Encounters:   07/31/23 94/60   05/02/23 122/60   01/25/23 102/70    Wt Readings from Last 3 Encounters:   07/31/23 60.6 kg (133 lb 8 oz)   01/25/23 62.1 kg (137 lb)   07/20/22 62.3 kg (137 lb 4.8 oz)                      Review of Systems   Constitutional, HEENT, cardiovascular, pulmonary, gi and gu systems are negative, except as otherwise  "noted.      Objective    BP 94/60 (BP Location: Right arm, Patient Position: Chair, Cuff Size: Adult Regular)   Pulse 65   Temp 98  F (36.7  C) (Tympanic)   Resp 16   Ht 1.715 m (5' 7.5\")   Wt 60.6 kg (133 lb 8 oz)   SpO2 98%   BMI 20.60 kg/m    Body mass index is 20.6 kg/m .  Physical Exam   GENERAL: healthy, alert and no distress  NECK: no adenopathy, no asymmetry, masses, or scars, thyroid normal to palpation, and no carotid bruits  RESP: lungs clear to auscultation - no rales, rhonchi or wheezes  CV: regular rate and rhythm, normal S1 S2,   MS: no gross musculoskeletal defects noted, no edema  PSYCH: mentation appears normal, affect normal/bright                      "

## 2023-07-31 ENCOUNTER — OFFICE VISIT (OUTPATIENT)
Dept: FAMILY MEDICINE | Facility: OTHER | Age: 68
End: 2023-07-31
Attending: NURSE PRACTITIONER
Payer: MEDICARE

## 2023-07-31 VITALS
TEMPERATURE: 98 F | WEIGHT: 133.5 LBS | HEIGHT: 68 IN | BODY MASS INDEX: 20.23 KG/M2 | DIASTOLIC BLOOD PRESSURE: 60 MMHG | RESPIRATION RATE: 16 BRPM | HEART RATE: 65 BPM | OXYGEN SATURATION: 98 % | SYSTOLIC BLOOD PRESSURE: 94 MMHG

## 2023-07-31 DIAGNOSIS — E78.5 HYPERLIPIDEMIA LDL GOAL <100: Primary | ICD-10-CM

## 2023-07-31 DIAGNOSIS — E03.4 HYPOTHYROIDISM DUE TO ACQUIRED ATROPHY OF THYROID: ICD-10-CM

## 2023-07-31 DIAGNOSIS — R13.10 DYSPHAGIA, UNSPECIFIED TYPE: ICD-10-CM

## 2023-07-31 DIAGNOSIS — G25.81 RESTLESS LEGS SYNDROME: ICD-10-CM

## 2023-07-31 DIAGNOSIS — Z79.899 ON STATIN THERAPY: ICD-10-CM

## 2023-07-31 LAB
ALBUMIN SERPL BCG-MCNC: 4.3 G/DL (ref 3.5–5.2)
ALP SERPL-CCNC: 52 U/L (ref 35–104)
ALT SERPL W P-5'-P-CCNC: 13 U/L (ref 0–50)
ANION GAP SERPL CALCULATED.3IONS-SCNC: 14 MMOL/L (ref 7–15)
AST SERPL W P-5'-P-CCNC: 23 U/L (ref 0–45)
BASOPHILS # BLD AUTO: 0 10E3/UL (ref 0–0.2)
BASOPHILS NFR BLD AUTO: 0 %
BILIRUB SERPL-MCNC: 0.6 MG/DL
BUN SERPL-MCNC: 24.7 MG/DL (ref 8–23)
CALCIUM SERPL-MCNC: 9.6 MG/DL (ref 8.8–10.2)
CHLORIDE SERPL-SCNC: 102 MMOL/L (ref 98–107)
CHOLEST SERPL-MCNC: 182 MG/DL
CREAT SERPL-MCNC: 1.04 MG/DL (ref 0.51–0.95)
DEPRECATED HCO3 PLAS-SCNC: 24 MMOL/L (ref 22–29)
EOSINOPHIL # BLD AUTO: 0.1 10E3/UL (ref 0–0.7)
EOSINOPHIL NFR BLD AUTO: 2 %
ERYTHROCYTE [DISTWIDTH] IN BLOOD BY AUTOMATED COUNT: 11.8 % (ref 10–15)
GFR SERPL CREATININE-BSD FRML MDRD: 59 ML/MIN/1.73M2
GLUCOSE SERPL-MCNC: 94 MG/DL (ref 70–99)
HCT VFR BLD AUTO: 45.8 % (ref 35–47)
HDLC SERPL-MCNC: 61 MG/DL
HGB BLD-MCNC: 15.4 G/DL (ref 11.7–15.7)
IMM GRANULOCYTES # BLD: 0 10E3/UL
IMM GRANULOCYTES NFR BLD: 0 %
LDLC SERPL CALC-MCNC: 80 MG/DL
LYMPHOCYTES # BLD AUTO: 1.8 10E3/UL (ref 0.8–5.3)
LYMPHOCYTES NFR BLD AUTO: 32 %
MCH RBC QN AUTO: 29.8 PG (ref 26.5–33)
MCHC RBC AUTO-ENTMCNC: 33.6 G/DL (ref 31.5–36.5)
MCV RBC AUTO: 89 FL (ref 78–100)
MONOCYTES # BLD AUTO: 0.5 10E3/UL (ref 0–1.3)
MONOCYTES NFR BLD AUTO: 9 %
NEUTROPHILS # BLD AUTO: 3.3 10E3/UL (ref 1.6–8.3)
NEUTROPHILS NFR BLD AUTO: 57 %
NONHDLC SERPL-MCNC: 121 MG/DL
PLATELET # BLD AUTO: 162 10E3/UL (ref 150–450)
POTASSIUM SERPL-SCNC: 4.4 MMOL/L (ref 3.4–5.3)
PROT SERPL-MCNC: 6.9 G/DL (ref 6.4–8.3)
RBC # BLD AUTO: 5.16 10E6/UL (ref 3.8–5.2)
SODIUM SERPL-SCNC: 140 MMOL/L (ref 136–145)
TRIGL SERPL-MCNC: 205 MG/DL
TSH SERPL DL<=0.005 MIU/L-ACNC: 1.11 UIU/ML (ref 0.3–4.2)
WBC # BLD AUTO: 5.8 10E3/UL (ref 4–11)

## 2023-07-31 PROCEDURE — 99214 OFFICE O/P EST MOD 30 MIN: CPT | Performed by: NURSE PRACTITIONER

## 2023-07-31 PROCEDURE — 85025 COMPLETE CBC W/AUTO DIFF WBC: CPT | Mod: ZL | Performed by: NURSE PRACTITIONER

## 2023-07-31 PROCEDURE — 36415 COLL VENOUS BLD VENIPUNCTURE: CPT | Mod: ZL | Performed by: NURSE PRACTITIONER

## 2023-07-31 PROCEDURE — G0463 HOSPITAL OUTPT CLINIC VISIT: HCPCS

## 2023-07-31 PROCEDURE — 80061 LIPID PANEL: CPT | Mod: ZL | Performed by: NURSE PRACTITIONER

## 2023-07-31 PROCEDURE — 84443 ASSAY THYROID STIM HORMONE: CPT | Mod: ZL | Performed by: NURSE PRACTITIONER

## 2023-07-31 PROCEDURE — 80053 COMPREHEN METABOLIC PANEL: CPT | Mod: ZL | Performed by: NURSE PRACTITIONER

## 2023-07-31 ASSESSMENT — PAIN SCALES - GENERAL: PAINLEVEL: NO PAIN (0)

## 2023-07-31 NOTE — PATIENT INSTRUCTIONS
Assessment & Plan     1. Hyperlipidemia LDL goal <100  Continue simvastatin   - Lipid Profile; Future  - Comprehensive metabolic panel; Future    2. Hypothyroidism due to acquired atrophy of thyroid  Continue levothyroxine   - TSH with free T4 reflex; Future  - CBC with Platelets & Differential; Future    3. Restless legs syndrome  Stable     4. On statin therapy  - Comprehensive metabolic panel; Future    5. Dysphagia, unspecified type  EGD  - Adult General Surg Referral      Return in about 6 months (around 1/31/2024) for lipids.    Zoë Romero NP  [facility]

## 2023-08-19 ENCOUNTER — MYC MEDICAL ADVICE (OUTPATIENT)
Dept: FAMILY MEDICINE | Facility: OTHER | Age: 68
End: 2023-08-19

## 2023-09-16 ENCOUNTER — MYC REFILL (OUTPATIENT)
Dept: FAMILY MEDICINE | Facility: OTHER | Age: 68
End: 2023-09-16

## 2023-09-16 DIAGNOSIS — G25.81 RESTLESS LEGS SYNDROME: ICD-10-CM

## 2023-09-18 RX ORDER — CLONAZEPAM 0.5 MG/1
0.5 TABLET ORAL
Qty: 90 TABLET | Refills: 0 | Status: SHIPPED | OUTPATIENT
Start: 2023-09-18 | End: 2024-02-01

## 2023-09-18 NOTE — TELEPHONE ENCOUNTER
Klonopin      Last Written Prescription Date:  6/26/23  Last Fill Quantity: 90,   # refills: 0  Last Office Visit: 7/31/23  Future Office visit:       Routing refill request to provider for review/approval because:

## 2023-11-07 ENCOUNTER — MYC REFILL (OUTPATIENT)
Dept: FAMILY MEDICINE | Facility: OTHER | Age: 68
End: 2023-11-07

## 2023-11-07 DIAGNOSIS — N95.1 MENOPAUSAL SYNDROME (HOT FLASHES): ICD-10-CM

## 2023-11-07 DIAGNOSIS — E78.5 HYPERLIPIDEMIA LDL GOAL <100: ICD-10-CM

## 2023-11-08 RX ORDER — ESTRADIOL 1 MG/1
1 TABLET ORAL DAILY
Qty: 90 TABLET | Refills: 1 | Status: SHIPPED | OUTPATIENT
Start: 2023-11-08 | End: 2024-04-29

## 2023-11-08 RX ORDER — SIMVASTATIN 20 MG
20 TABLET ORAL AT BEDTIME
Qty: 90 TABLET | Refills: 1 | Status: SHIPPED | OUTPATIENT
Start: 2023-11-08 | End: 2024-04-29

## 2023-11-08 RX ORDER — PROGESTERONE 200 MG/1
CAPSULE ORAL
Qty: 90 CAPSULE | Refills: 1 | Status: SHIPPED | OUTPATIENT
Start: 2023-11-08 | End: 2024-04-29

## 2023-11-15 ENCOUNTER — OFFICE VISIT (OUTPATIENT)
Dept: FAMILY MEDICINE | Facility: OTHER | Age: 68
End: 2023-11-15
Attending: NURSE PRACTITIONER
Payer: MEDICARE

## 2023-11-15 VITALS
TEMPERATURE: 98.4 F | OXYGEN SATURATION: 97 % | WEIGHT: 136 LBS | SYSTOLIC BLOOD PRESSURE: 118 MMHG | DIASTOLIC BLOOD PRESSURE: 68 MMHG | HEART RATE: 77 BPM | BODY MASS INDEX: 20.99 KG/M2 | RESPIRATION RATE: 16 BRPM

## 2023-11-15 DIAGNOSIS — G25.81 RESTLESS LEGS SYNDROME: ICD-10-CM

## 2023-11-15 DIAGNOSIS — N63.31 MASS OF AXILLARY TAIL OF RIGHT BREAST: ICD-10-CM

## 2023-11-15 DIAGNOSIS — N63.21 MASS OF UPPER OUTER QUADRANT OF LEFT BREAST: Primary | ICD-10-CM

## 2023-11-15 PROCEDURE — G0463 HOSPITAL OUTPT CLINIC VISIT: HCPCS

## 2023-11-15 PROCEDURE — 99214 OFFICE O/P EST MOD 30 MIN: CPT | Performed by: NURSE PRACTITIONER

## 2023-11-15 RX ORDER — PRAMIPEXOLE DIHYDROCHLORIDE 0.5 MG/1
0.5 TABLET ORAL AT BEDTIME
Qty: 30 TABLET | Refills: 3 | Status: SHIPPED | OUTPATIENT
Start: 2023-11-15 | End: 2024-02-01

## 2023-11-15 ASSESSMENT — PAIN SCALES - GENERAL: PAINLEVEL: NO PAIN (0)

## 2023-11-15 NOTE — PROGRESS NOTES
Assessment & Plan     1. Mass of upper outer quadrant of left breast  [facility]  - MA Diagnostic Digital Bilateral; Future  - US Breast Left Limited 1-3 Quadrants    2. Mass of axillary tail of right breast  - MA Diagnostic Digital Bilateral; Future  - US Breast Right Limited 1-3 Quadrants    3. Restless legs syndrome  Stop clonazepam   - pramipexole (MIRAPEX) 0.5 MG tablet; Take 1 tablet (0.5 mg) by mouth at bedtime  Dispense: 30 tablet; Refill: 3      Will notify of results as they are returned.     Zoë Romero, NP  [facility] - [place] DENE[place] Cisneros is a 67 year old, presenting for the following health issues:  Breast Problem (Lump in left breast and right arm pit)      HPI     Concern - Lumps right arm pit and left breast  Onset: Noticed a few weeks ago and have monitoring  Description: Lump on left breast/right arm pit  Intensity: mild  Progression of Symptoms:  same  Accompanying Signs & Symptoms: None  Previous history of similar problem: None  Precipitating factors:        Worsened by: None  Alleviating factors:        Improved by: None  Therapies tried and outcome: None      Last mammogram 3/18/2023, negative.     Recent Labs   Lab Test 07/31/23  1147 01/25/23  1202 07/20/22  1455   LDL 80 72 70   HDL 61 64 61   TRIG 205* 223* 273*   ALT 13 14 19   CR 1.04* 0.88 0.85   GFRESTIMATED 59* 73 76   POTASSIUM 4.4 4.2 3.8   TSH 1.11 1.07 0.96      BP Readings from Last 3 Encounters:   11/15/23 118/68   07/31/23 94/60   05/02/23 122/60    Wt Readings from Last 3 Encounters:   11/15/23 61.7 kg (136 lb)   07/31/23 60.6 kg (133 lb 8 oz)   01/25/23 62.1 kg (137 lb)                        Review of Systems   Constitutional, HEENT, cardiovascular, pulmonary, gi and gu systems are negative, except as otherwise noted.      Objective    /68 (BP Location: Left arm, Patient Position: Sitting, Cuff Size: Adult Regular)   Pulse 77   Temp 98.4  F (36.9  C) (Tympanic)   Resp 16    Wt 61.7 kg (136 lb)   SpO2 97%   BMI 20.99 kg/m    Body mass index is 20.99 kg/m .  Physical Exam   GENERAL: healthy, alert and no distress  BREAST: left breast, mass outer aspect, non-tender.  Right, normal but axilla thickened and possible lymph node.   MS: no gross musculoskeletal defects noted, no edema  PSYCH: mentation appears normal, affect normal/bright        Exam Accession# [id number]     MAMM GHADA SCREEN DIGITAL BILAT     COMPARISON: 03/10/2022, 03/22/2021, 03/09/2021, 03/06/2020, 02/15/2016.     TECHNICAL: Standard MLO and CC views of both breasts acquired with 3D tomosynthesis.     Computer-Aided Detection System was utilized.     BREAST DENSITY: The breasts are extremely dense, which lowers the sensitivity of mammography.     FINDINGS: No suspicious masses, architectural distortion, skin thickening, nipple retraction, or suspicious calcifications are appreciated.     IMPRESSION: No suspicious radiographic findings. Routine followup recommended with mammogram in 1 year.     BI-RADS 1: Negative.

## 2023-11-15 NOTE — PATIENT INSTRUCTIONS
Assessment & Plan     1. Mass of upper outer quadrant of left breast  [facility]  - MA Diagnostic Digital Bilateral; Future  - US Breast Left Limited 1-3 Quadrants    2. Mass of axillary tail of right breast  - MA Diagnostic Digital Bilateral; Future  - US Breast Right Limited 1-3 Quadrants    3. Restless legs syndrome  Stop clonazepam   - pramipexole (MIRAPEX) 0.5 MG tablet; Take 1 tablet (0.5 mg) by mouth at bedtime  Dispense: 30 tablet; Refill: 3      Will notify of results as they are returned.     Zoë Romero, NP  [facility]

## 2023-11-16 RX ORDER — PRAMIPEXOLE DIHYDROCHLORIDE 0.5 MG/1
0.5 TABLET ORAL AT BEDTIME
Qty: 90 TABLET | OUTPATIENT
Start: 2023-11-16

## 2023-11-30 ENCOUNTER — TRANSFERRED RECORDS (OUTPATIENT)
Dept: HEALTH INFORMATION MANAGEMENT | Facility: CLINIC | Age: 68
End: 2023-11-30

## 2023-12-22 DIAGNOSIS — E03.4 HYPOTHYROIDISM DUE TO ACQUIRED ATROPHY OF THYROID: ICD-10-CM

## 2023-12-22 RX ORDER — LEVOTHYROXINE SODIUM 75 UG/1
75 TABLET ORAL DAILY
Qty: 90 TABLET | Refills: 1 | Status: SHIPPED | OUTPATIENT
Start: 2023-12-22 | End: 2024-06-19

## 2023-12-22 NOTE — TELEPHONE ENCOUNTER
Levothyroxine (Synthroid/Levothroid) 75 MCG tablet    Last Written Prescription Date:  07/03/2023  Last Fill Quantity: 90,   # refills: 1  Last Office Visit: 11/15/2023

## 2024-01-30 NOTE — PROGRESS NOTES
Assessment & Plan     1. Hyperlipidemia LDL goal <100  Continue simvastatin   - Lipid Profile; Future  - Comprehensive metabolic panel; Future    2. Hypothyroidism due to acquired atrophy of thyroid  Continue levothyroxine   - TSH with free T4 reflex; Future    3. Restless legs syndrome  Continue current plan   - pramipexole (MIRAPEX) 0.5 MG tablet; Take 1 tablet (0.5 mg) by mouth at bedtime  Dispense: 90 tablet; Refill: 1    4. Menopausal syndrome (hot flashes)  Continue current plan     5. On statin therapy  - Comprehensive metabolic panel; Future    6. Primary insomnia  - hydrOXYzine HCl (ATARAX) 25 MG tablet; Take 1-2 tablets (25-50 mg) by mouth nightly as needed (insomnia)  Dispense: 60 tablet; Refill: 1    7. Encounter for screening mammogram for malignant neoplasm of breast  - MA Screen Bilateral w/Ozzie; Future      Return in about 6 months (around 8/1/2024) for thyroid, lipids.    Zoë Romero,   Certified Adult Nurse Practitioner  [phone]      Emma Cisneros is a 68 year old, presenting for the following health issues:  Lipids and Hypertension        2/1/2024    10:44 AM   Additional Questions   Roomed by Deepti GUEVARA   Accompanied by None     HPI     Hyperlipidemia Follow-Up    Are you regularly taking any medication or supplement to lower your cholesterol?   Yes- Zocor  Are you having muscle aches or other side effects that you think could be caused by your cholesterol lowering medication?  No    Hypertension Follow-up    Do you check your blood pressure regularly outside of the clinic? No   Are you following a low salt diet? No  Are your blood pressures ever more than 140 on the top number (systolic) OR more   than 90 on the bottom number (diastolic), for example 140/90? No Does not check outside of clinic      Insomnia - tried melatonin, no effect.  Sleep supplement, no working.  Years ago she was prescribed klonopin for restless legs and insomnia - she does not want to go back to that  medication due to increased risk of dementia.  She would like to try something else.  Mirapex does work for restless legs, just does not help her sleep.        Recent Labs   Lab Test 07/31/23  1147 01/25/23  1202 07/20/22  1455   LDL 80 72 70   HDL 61 64 61   TRIG 205* 223* 273*   ALT 13 14 19   CR 1.04* 0.88 0.85   GFRESTIMATED 59* 73 76   POTASSIUM 4.4 4.2 3.8   TSH 1.11 1.07 0.96      BP Readings from Last 3 Encounters:   02/01/24 112/72   11/15/23 118/68   07/31/23 94/60    Wt Readings from Last 3 Encounters:   02/01/24 59.9 kg (132 lb)   11/15/23 61.7 kg (136 lb)   07/31/23 60.6 kg (133 lb 8 oz)                        Review of Systems  Constitutional, neuro, ENT, endocrine, pulmonary, cardiac, gastrointestinal, genitourinary, musculoskeletal, integument and psychiatric systems are negative, except as otherwise noted.      Objective    /72 (BP Location: Right arm, Patient Position: Sitting, Cuff Size: Adult Regular)   Pulse 70   Temp 98.1  F (36.7  C) (Tympanic)   Resp 16   Wt 59.9 kg (132 lb)   SpO2 97%   Breastfeeding No   BMI 20.37 kg/m    Body mass index is 20.37 kg/m .  Physical Exam   GENERAL: alert and no distress  NECK: no adenopathy, no asymmetry, masses, or scars, thyroid normal to palpation, and no carotid bruits  RESP: lungs clear to auscultation - no rales, rhonchi or wheezes  CV: regular rate and rhythm, normal S1 S2, no S3 or S4, no murmur  MS: no gross musculoskeletal defects noted, no edema  PSYCH: mentation appears normal, affect normal/bright            Signed Electronically by: Zoë Romero NP

## 2024-02-01 ENCOUNTER — OFFICE VISIT (OUTPATIENT)
Dept: FAMILY MEDICINE | Facility: OTHER | Age: 69
End: 2024-02-01
Attending: NURSE PRACTITIONER
Payer: MEDICARE

## 2024-02-01 VITALS
WEIGHT: 132 LBS | OXYGEN SATURATION: 97 % | RESPIRATION RATE: 16 BRPM | BODY MASS INDEX: 20.37 KG/M2 | DIASTOLIC BLOOD PRESSURE: 72 MMHG | SYSTOLIC BLOOD PRESSURE: 112 MMHG | TEMPERATURE: 98.1 F | HEART RATE: 70 BPM

## 2024-02-01 DIAGNOSIS — E78.5 HYPERLIPIDEMIA LDL GOAL <100: Primary | ICD-10-CM

## 2024-02-01 DIAGNOSIS — F51.01 PRIMARY INSOMNIA: ICD-10-CM

## 2024-02-01 DIAGNOSIS — G25.81 RESTLESS LEGS SYNDROME: ICD-10-CM

## 2024-02-01 DIAGNOSIS — Z12.31 ENCOUNTER FOR SCREENING MAMMOGRAM FOR MALIGNANT NEOPLASM OF BREAST: ICD-10-CM

## 2024-02-01 DIAGNOSIS — N95.1 MENOPAUSAL SYNDROME (HOT FLASHES): ICD-10-CM

## 2024-02-01 DIAGNOSIS — Z79.899 ON STATIN THERAPY: ICD-10-CM

## 2024-02-01 DIAGNOSIS — E03.4 HYPOTHYROIDISM DUE TO ACQUIRED ATROPHY OF THYROID: ICD-10-CM

## 2024-02-01 LAB
ALBUMIN SERPL BCG-MCNC: 4.7 G/DL (ref 3.5–5.2)
ALP SERPL-CCNC: 55 U/L (ref 40–150)
ALT SERPL W P-5'-P-CCNC: 12 U/L (ref 0–50)
ANION GAP SERPL CALCULATED.3IONS-SCNC: 12 MMOL/L (ref 7–15)
AST SERPL W P-5'-P-CCNC: 24 U/L (ref 0–45)
BILIRUB SERPL-MCNC: 0.7 MG/DL
BUN SERPL-MCNC: 16.9 MG/DL (ref 8–23)
CALCIUM SERPL-MCNC: 9.6 MG/DL (ref 8.8–10.2)
CHLORIDE SERPL-SCNC: 101 MMOL/L (ref 98–107)
CHOLEST SERPL-MCNC: 182 MG/DL
CREAT SERPL-MCNC: 0.9 MG/DL (ref 0.51–0.95)
DEPRECATED HCO3 PLAS-SCNC: 26 MMOL/L (ref 22–29)
EGFRCR SERPLBLD CKD-EPI 2021: 70 ML/MIN/1.73M2
FASTING STATUS PATIENT QL REPORTED: YES
GLUCOSE SERPL-MCNC: 87 MG/DL (ref 70–99)
HDLC SERPL-MCNC: 66 MG/DL
HOLD SPECIMEN: NORMAL
LDLC SERPL CALC-MCNC: 69 MG/DL
NONHDLC SERPL-MCNC: 116 MG/DL
POTASSIUM SERPL-SCNC: 4.1 MMOL/L (ref 3.4–5.3)
PROT SERPL-MCNC: 7.1 G/DL (ref 6.4–8.3)
SODIUM SERPL-SCNC: 139 MMOL/L (ref 135–145)
TRIGL SERPL-MCNC: 233 MG/DL
TSH SERPL DL<=0.005 MIU/L-ACNC: 1.39 UIU/ML (ref 0.3–4.2)

## 2024-02-01 PROCEDURE — 80053 COMPREHEN METABOLIC PANEL: CPT | Mod: ZL | Performed by: NURSE PRACTITIONER

## 2024-02-01 PROCEDURE — 36415 COLL VENOUS BLD VENIPUNCTURE: CPT | Mod: ZL | Performed by: NURSE PRACTITIONER

## 2024-02-01 PROCEDURE — 99214 OFFICE O/P EST MOD 30 MIN: CPT | Performed by: NURSE PRACTITIONER

## 2024-02-01 PROCEDURE — G0463 HOSPITAL OUTPT CLINIC VISIT: HCPCS

## 2024-02-01 PROCEDURE — 80061 LIPID PANEL: CPT | Mod: ZL | Performed by: NURSE PRACTITIONER

## 2024-02-01 PROCEDURE — 84443 ASSAY THYROID STIM HORMONE: CPT | Mod: ZL | Performed by: NURSE PRACTITIONER

## 2024-02-01 RX ORDER — HYDROXYZINE HYDROCHLORIDE 25 MG/1
25-50 TABLET, FILM COATED ORAL
Qty: 60 TABLET | Refills: 1 | Status: SHIPPED | OUTPATIENT
Start: 2024-02-01 | End: 2024-08-01

## 2024-02-01 RX ORDER — PRAMIPEXOLE DIHYDROCHLORIDE 0.5 MG/1
0.5 TABLET ORAL AT BEDTIME
Qty: 90 TABLET | Refills: 1 | Status: SHIPPED | OUTPATIENT
Start: 2024-02-01 | End: 2024-07-23

## 2024-02-01 ASSESSMENT — PAIN SCALES - GENERAL: PAINLEVEL: NO PAIN (0)

## 2024-03-09 ENCOUNTER — HEALTH MAINTENANCE LETTER (OUTPATIENT)
Age: 69
End: 2024-03-09

## 2024-04-29 ENCOUNTER — MYC REFILL (OUTPATIENT)
Dept: FAMILY MEDICINE | Facility: OTHER | Age: 69
End: 2024-04-29

## 2024-04-29 DIAGNOSIS — N95.1 MENOPAUSAL SYNDROME (HOT FLASHES): ICD-10-CM

## 2024-04-29 DIAGNOSIS — E78.5 HYPERLIPIDEMIA LDL GOAL <100: ICD-10-CM

## 2024-04-29 NOTE — TELEPHONE ENCOUNTER
Simvastatin      Last Written Prescription Date:  11/8/23  Last Fill Quantity: 90,   # refills: 1  Last Office Visit: 2/1/24  Future Office visit:    Next 5 appointments (look out 90 days)      May 07, 2024 10:00 AM  (Arrive by 9:45 AM)  Return Visit with SHANNAN Oswald MD  [facility] ([facility] ) [address] [place] [address]  [phone]             Routing refill request to provider for review/approval because:      Estradiol      Last Written Prescription Date:  11/8/23  Last Fill Quantity: 90,   # refills: 1  Last Office Visit: 2/1/24  Future Office visit:    Next 5 appointments (look out 90 days)      May 07, 2024 10:00 AM  (Arrive by 9:45 AM)  Return Visit with SHANNAN Oswald MD  [facility] ([facility] ) [address] [place] [address]  [phone]             Routing refill request to provider for review/approval because:      Progesterone      Last Written Prescription Date:  11/8/23  Last Fill Quantity: 90,   # refills: 1  Last Office Visit: 2/1/24  Future Office visit:    Next 5 appointments (look out 90 days)      May 07, 2024 10:00 AM  (Arrive by 9:45 AM)  Return Visit with SHANNAN Oswadl MD  [facility] ([facility] ) [address] [place] [address]  [phone]             Routing refill request to provider for review/approval because:

## 2024-05-01 RX ORDER — SIMVASTATIN 20 MG
20 TABLET ORAL AT BEDTIME
Qty: 90 TABLET | Refills: 2 | Status: SHIPPED | OUTPATIENT
Start: 2024-05-01

## 2024-05-01 RX ORDER — PROGESTERONE 200 MG/1
CAPSULE ORAL
Qty: 90 CAPSULE | Refills: 1 | Status: SHIPPED | OUTPATIENT
Start: 2024-05-01

## 2024-05-01 RX ORDER — ESTRADIOL 1 MG/1
1 TABLET ORAL DAILY
Qty: 90 TABLET | Refills: 1 | Status: SHIPPED | OUTPATIENT
Start: 2024-05-01

## 2024-05-01 NOTE — TELEPHONE ENCOUNTER
Contraceptives Protocol [id number]/29/2024 09:37 AM   Protocol Details Medication indicated for associated diagnosis         progesterone (PROMETRIUM) 200 MG capsule     Hormone Replacement Therapy [id number]/29/2024 09:37 AM   Protocol Details Medication indicated for associated diagnosis

## 2024-05-07 ENCOUNTER — OFFICE VISIT (OUTPATIENT)
Dept: DERMATOLOGY | Facility: OTHER | Age: 69
End: 2024-05-07
Attending: DERMATOLOGY
Payer: MEDICARE

## 2024-05-07 VITALS
RESPIRATION RATE: 18 BRPM | TEMPERATURE: 98.6 F | DIASTOLIC BLOOD PRESSURE: 62 MMHG | HEART RATE: 73 BPM | OXYGEN SATURATION: 97 % | SYSTOLIC BLOOD PRESSURE: 110 MMHG

## 2024-05-07 DIAGNOSIS — L57.0 ACTINIC KERATOSIS: Primary | ICD-10-CM

## 2024-05-07 DIAGNOSIS — L98.9 SKIN LESION: ICD-10-CM

## 2024-05-07 PROCEDURE — G0463 HOSPITAL OUTPT CLINIC VISIT: HCPCS | Mod: 25

## 2024-05-07 PROCEDURE — 88305 TISSUE EXAM BY PATHOLOGIST: CPT | Mod: TC | Performed by: DERMATOLOGY

## 2024-05-07 PROCEDURE — 88305 TISSUE EXAM BY PATHOLOGIST: CPT | Mod: 26 | Performed by: PATHOLOGY

## 2024-05-07 PROCEDURE — 69100 BIOPSY OF EXTERNAL EAR: CPT | Performed by: DERMATOLOGY

## 2024-05-07 RX ORDER — FLUOROURACIL 50 MG/G
CREAM TOPICAL
Qty: 40 G | Refills: 0 | Status: SHIPPED | OUTPATIENT
Start: 2024-05-07 | End: 2024-08-01

## 2024-05-07 ASSESSMENT — PAIN SCALES - GENERAL: PAINLEVEL: NO PAIN (0)

## 2024-05-07 NOTE — LETTER
5/7/2024       RE: Sydni Hodgson  [address] [facility] [address]     Dear Colleague,    Thank you for referring your patient, Sydni Hodgson, to the [facility]. Please see a copy of my visit note below.    S: First visit for Blayne who has developed a lesion on the posterior aspect of her left ear.  It has grown slowly but has not been particularly painful nor has it bled.    O: Present on the left ear inferior helix is a proximately 1 cm slightly raised whitish-pink tumor.    A: Likely a skin cancer of the is a squamous or basal cell type.    P area was anesthetized in the lesion was shave removed approximately 75% of it.  Specimen submitted to [facility].    Result returned as a basal cell carcinoma nodular type.    I recommended referral to Dr. Amador who is a Mohs surgeon in [place].  I contacted Dr. Amador and she indicated she would be happy to perform the Mohs surgery.  Blayne will be contacted in the referral will be sent including photographs of the lesion and the pathology report.    Return probably in 6 months after full healing of the ear lesion.    Also Blayne had some actinic damage and for that I recommended fluorouracil cream.  To be used for 10 to 14 days.    Again, thank you for allowing me to participate in the care of your patient.      Sincerely,    SHANNAN Oswald MD

## 2024-05-10 LAB
PATH REPORT.COMMENTS IMP SPEC: NORMAL
PATH REPORT.COMMENTS IMP SPEC: NORMAL
PATH REPORT.FINAL DX SPEC: NORMAL
PATH REPORT.GROSS SPEC: NORMAL
PATH REPORT.MICROSCOPIC SPEC OTHER STN: NORMAL
PATH REPORT.RELEVANT HX SPEC: NORMAL

## 2024-05-12 NOTE — PROGRESS NOTES
S: First visit for Blayne who has developed a lesion on the posterior aspect of her left ear.  It has grown slowly but has not been particularly painful nor has it bled.    O: Present on the left ear inferior helix is a proximately 1 cm slightly raised whitish-pink tumor.    A: Likely a skin cancer of the is a squamous or basal cell type.    P area was anesthetized in the lesion was shave removed approximately 75% of it.  Specimen submitted to [facility].    Result returned as a basal cell carcinoma nodular type.    I recommended referral to Dr. Amador who is a Mohs surgeon in [place].  I contacted Dr. Amador and she indicated she would be happy to perform the Mohs surgery.  Blayne will be contacted in the referral will be sent including photographs of the lesion and the pathology report.    Return probably in 6 months after full healing of the ear lesion.    Also Blayne had some actinic damage and for that I recommended fluorouracil cream.  To be used for 10 to 14 days.

## 2024-05-14 ENCOUNTER — TELEPHONE (OUTPATIENT)
Dept: DERMATOLOGY | Facility: CLINIC | Age: 69
End: 2024-05-14

## 2024-05-14 DIAGNOSIS — C44.211: Primary | ICD-10-CM

## 2024-05-14 NOTE — TELEPHONE ENCOUNTER
Joi Diop LPN  5/14/2024  8:43 AM CDT Back to Top      Referral placed. Spoke with patient regarding test results and agrees to plan. Joi Diop LPN  PAC's please send referral, notes,Bx and photos.  Dr Darek Oswald MD  5/10/2024  4:42 PM CDT       Lesion from Blayne's  ear was a basal cell and the type that tends to come back. Favor our setting her up with Dr Amador in [place] for Mohs. I mentioned Dr Amador to her and she seemed to be ok with this referral; I'll notifiy Dr MURCIA that I'd like her to see Blayne. Thanks

## 2024-06-08 DIAGNOSIS — C44.219 BASAL CELL CARCINOMA (BCC) OF HELIX OF LEFT EAR: Primary | ICD-10-CM

## 2024-06-19 DIAGNOSIS — E03.4 HYPOTHYROIDISM DUE TO ACQUIRED ATROPHY OF THYROID: ICD-10-CM

## 2024-06-19 RX ORDER — LEVOTHYROXINE SODIUM 75 UG/1
75 TABLET ORAL DAILY
Qty: 90 TABLET | Refills: 1 | Status: SHIPPED | OUTPATIENT
Start: 2024-06-19

## 2024-06-19 NOTE — TELEPHONE ENCOUNTER
Synthroid      Last Written Prescription Date:  12/22/23  Last Fill Quantity: 90,   # refills: 1  Last Office Visit: 2/1/24  Future Office visit:    Next 5 appointments (look out 90 days)      Aug 01, 2024 11:00 AM  (Arrive by 10:45 AM)  Adult Preventative Visit with Zoë Romero NP  [facility] ([facility] ) [address]  [facility] [address]  [phone]             Routing refill request to provider for review/approval because:

## 2024-07-23 DIAGNOSIS — G25.81 RESTLESS LEGS SYNDROME: ICD-10-CM

## 2024-07-23 RX ORDER — PRAMIPEXOLE DIHYDROCHLORIDE 0.5 MG/1
0.5 TABLET ORAL AT BEDTIME
Qty: 90 TABLET | Refills: 1 | Status: SHIPPED | OUTPATIENT
Start: 2024-07-23

## 2024-07-28 SDOH — HEALTH STABILITY: PHYSICAL HEALTH: ON AVERAGE, HOW MANY DAYS PER WEEK DO YOU ENGAGE IN MODERATE TO STRENUOUS EXERCISE (LIKE A BRISK WALK)?: 1 DAY

## 2024-07-28 SDOH — HEALTH STABILITY: PHYSICAL HEALTH: ON AVERAGE, HOW MANY MINUTES DO YOU ENGAGE IN EXERCISE AT THIS LEVEL?: 60 MIN

## 2024-07-28 ASSESSMENT — SOCIAL DETERMINANTS OF HEALTH (SDOH): HOW OFTEN DO YOU GET TOGETHER WITH FRIENDS OR RELATIVES?: THREE TIMES A WEEK

## 2024-08-01 ENCOUNTER — OFFICE VISIT (OUTPATIENT)
Dept: FAMILY MEDICINE | Facility: OTHER | Age: 69
End: 2024-08-01
Attending: NURSE PRACTITIONER
Payer: MEDICARE

## 2024-08-01 VITALS
BODY MASS INDEX: 22.37 KG/M2 | DIASTOLIC BLOOD PRESSURE: 62 MMHG | HEIGHT: 66 IN | HEART RATE: 68 BPM | TEMPERATURE: 97.7 F | WEIGHT: 139.2 LBS | SYSTOLIC BLOOD PRESSURE: 115 MMHG | RESPIRATION RATE: 16 BRPM | OXYGEN SATURATION: 97 %

## 2024-08-01 DIAGNOSIS — E03.4 HYPOTHYROIDISM DUE TO ACQUIRED ATROPHY OF THYROID: ICD-10-CM

## 2024-08-01 DIAGNOSIS — C44.310 BCC (BASAL CELL CARCINOMA), FACE: ICD-10-CM

## 2024-08-01 DIAGNOSIS — E78.5 HYPERLIPIDEMIA LDL GOAL <100: ICD-10-CM

## 2024-08-01 DIAGNOSIS — G25.81 RESTLESS LEGS SYNDROME: ICD-10-CM

## 2024-08-01 DIAGNOSIS — Z79.899 ON STATIN THERAPY: ICD-10-CM

## 2024-08-01 DIAGNOSIS — N95.1 MENOPAUSAL SYNDROME (HOT FLASHES): ICD-10-CM

## 2024-08-01 DIAGNOSIS — Z00.00 ANNUAL PHYSICAL EXAM: Primary | ICD-10-CM

## 2024-08-01 DIAGNOSIS — F51.01 PRIMARY INSOMNIA: ICD-10-CM

## 2024-08-01 LAB
ALBUMIN SERPL BCG-MCNC: 4.4 G/DL (ref 3.5–5.2)
ALP SERPL-CCNC: 62 U/L (ref 40–150)
ALT SERPL W P-5'-P-CCNC: 14 U/L (ref 0–50)
ANION GAP SERPL CALCULATED.3IONS-SCNC: 14 MMOL/L (ref 7–15)
AST SERPL W P-5'-P-CCNC: 24 U/L (ref 0–45)
BASOPHILS # BLD AUTO: 0 10E3/UL (ref 0–0.2)
BASOPHILS NFR BLD AUTO: 0 %
BILIRUB SERPL-MCNC: 0.6 MG/DL
BUN SERPL-MCNC: 23.1 MG/DL (ref 8–23)
CALCIUM SERPL-MCNC: 9.7 MG/DL (ref 8.8–10.4)
CHLORIDE SERPL-SCNC: 101 MMOL/L (ref 98–107)
CHOLEST SERPL-MCNC: 185 MG/DL
CREAT SERPL-MCNC: 1 MG/DL (ref 0.51–0.95)
EGFRCR SERPLBLD CKD-EPI 2021: 62 ML/MIN/1.73M2
EOSINOPHIL # BLD AUTO: 0.1 10E3/UL (ref 0–0.7)
EOSINOPHIL NFR BLD AUTO: 2 %
ERYTHROCYTE [DISTWIDTH] IN BLOOD BY AUTOMATED COUNT: 11.7 % (ref 10–15)
FASTING STATUS PATIENT QL REPORTED: YES
FASTING STATUS PATIENT QL REPORTED: YES
GLUCOSE SERPL-MCNC: 94 MG/DL (ref 70–99)
HCO3 SERPL-SCNC: 25 MMOL/L (ref 22–29)
HCT VFR BLD AUTO: 45.2 % (ref 35–47)
HDLC SERPL-MCNC: 69 MG/DL
HGB BLD-MCNC: 15.1 G/DL (ref 11.7–15.7)
IMM GRANULOCYTES # BLD: 0 10E3/UL
IMM GRANULOCYTES NFR BLD: 0 %
LDLC SERPL CALC-MCNC: 82 MG/DL
LYMPHOCYTES # BLD AUTO: 2 10E3/UL (ref 0.8–5.3)
LYMPHOCYTES NFR BLD AUTO: 35 %
MCH RBC QN AUTO: 29.7 PG (ref 26.5–33)
MCHC RBC AUTO-ENTMCNC: 33.4 G/DL (ref 31.5–36.5)
MCV RBC AUTO: 89 FL (ref 78–100)
MONOCYTES # BLD AUTO: 0.4 10E3/UL (ref 0–1.3)
MONOCYTES NFR BLD AUTO: 7 %
NEUTROPHILS # BLD AUTO: 3.2 10E3/UL (ref 1.6–8.3)
NEUTROPHILS NFR BLD AUTO: 56 %
NONHDLC SERPL-MCNC: 116 MG/DL
PLATELET # BLD AUTO: 168 10E3/UL (ref 150–450)
POTASSIUM SERPL-SCNC: 4.1 MMOL/L (ref 3.4–5.3)
PROT SERPL-MCNC: 7.5 G/DL (ref 6.4–8.3)
RBC # BLD AUTO: 5.09 10E6/UL (ref 3.8–5.2)
SODIUM SERPL-SCNC: 140 MMOL/L (ref 135–145)
TRIGL SERPL-MCNC: 169 MG/DL
TSH SERPL DL<=0.005 MIU/L-ACNC: 2.39 UIU/ML (ref 0.3–4.2)
WBC # BLD AUTO: 5.7 10E3/UL (ref 4–11)

## 2024-08-01 PROCEDURE — 36415 COLL VENOUS BLD VENIPUNCTURE: CPT | Mod: ZL | Performed by: NURSE PRACTITIONER

## 2024-08-01 PROCEDURE — 80061 LIPID PANEL: CPT | Mod: ZL | Performed by: NURSE PRACTITIONER

## 2024-08-01 PROCEDURE — 99213 OFFICE O/P EST LOW 20 MIN: CPT | Mod: 25 | Performed by: NURSE PRACTITIONER

## 2024-08-01 PROCEDURE — G0463 HOSPITAL OUTPT CLINIC VISIT: HCPCS

## 2024-08-01 PROCEDURE — 85041 AUTOMATED RBC COUNT: CPT | Mod: ZL | Performed by: NURSE PRACTITIONER

## 2024-08-01 PROCEDURE — 84443 ASSAY THYROID STIM HORMONE: CPT | Mod: ZL | Performed by: NURSE PRACTITIONER

## 2024-08-01 PROCEDURE — 80053 COMPREHEN METABOLIC PANEL: CPT | Mod: ZL | Performed by: NURSE PRACTITIONER

## 2024-08-01 PROCEDURE — G0438 PPPS, INITIAL VISIT: HCPCS | Performed by: NURSE PRACTITIONER

## 2024-08-01 ASSESSMENT — PAIN SCALES - GENERAL: PAINLEVEL: NO PAIN (0)

## 2024-08-01 NOTE — PATIENT INSTRUCTIONS
Assessment & Plan     1. Annual physical exam  Exam completed     2. Hyperlipidemia LDL goal <100  Continue simvastatin   - Lipid Profile; Future  - Comprehensive metabolic panel; Future  - CBC with Platelets & Differential; Future    3. Hypothyroidism due to acquired atrophy of thyroid  Continue levothyroxine   - TSH with free T4 reflex; Future    4. Restless legs syndrome  Continue mirapex    5. Menopausal syndrome (hot flashes)  Stable     6. BCC (basal cell carcinoma), face  Continue following with dermatology     7. Primary insomnia  Stable, stopped hydroxyzine.     8. On statin therapy  - Comprehensive metabolic panel; Future

## 2024-08-01 NOTE — PROGRESS NOTES
Preventive Care Visit  [facility]  Zoë Romero, KATARINA, Family Medicine  Aug 1, 2024      Assessment & Plan     1. Annual physical exam  Exam completed     2. Hyperlipidemia LDL goal <100  Continue simvastatin   - Lipid Profile; Future  - Comprehensive metabolic panel; Future  - CBC with Platelets & Differential; Future    3. Hypothyroidism due to acquired atrophy of thyroid  Continue levothyroxine   - TSH with free T4 reflex; Future    4. Restless legs syndrome  Continue mirapex    5. Menopausal syndrome (hot flashes)  Stable     6. BCC (basal cell carcinoma), face  Continue following with dermatology     7. Primary insomnia  Stable, stopped hydroxyzine.     8. On statin therapy  - Comprehensive metabolic panel; Future      Patient has been advised of split billing requirements and indicates understanding: Yes        Counseling  Appropriate preventive services were addressed with this patient via screening, questionnaire, or discussion as appropriate for fall prevention, nutrition, physical activity, Tobacco-use cessation, weight loss and cognition.  Checklist reviewing preventive services available has been given to the patient.  Reviewed patient's diet, addressing concerns and/or questions.   She is at risk for lack of exercise and has been provided with information to increase physical activity for the benefit of her well-being.   She is at risk for psychosocial distress and has been provided with information to reduce risk.   Patient reported safety concerns were addressed today.        Return in about 6 months (around 2/1/2025) for thyroid, lipids, restless legs.    Zoë Romero,   Certified Adult Nurse Practitioner  [phone]      Emma Cisneros is a 68 year old, presenting for the following:  Physical        8/1/2024    10:53 AM   Additional Questions   Roomed by Deepti GUEVARA   Accompanied by None     Health Care Directive  Patient has a Health Care Directive on file  Advance care  planning document is on file and is current.    HPI    Hyperlipidemia Follow-Up    Are you regularly taking any medication or supplement to lower your cholesterol?   Yes- simvastatin   Are you having muscle aches or other side effects that you think could be caused by your cholesterol lowering medication?  No    Hypothyroidism Follow-up    Since last visit, patient describes the following symptoms: Weight stable, no hair loss, no skin changes, no constipation, no loose stools        7/28/2024   General Health   How would you rate your overall physical health? Excellent   Feel stress (tense, anxious, or unable to sleep) Only a little      (!) STRESS CONCERN      7/28/2024   Nutrition   Diet: Regular (no restrictions)            7/28/2024   Exercise   Days per week of moderate/strenous exercise 1 day   Average minutes spent exercising at this level 60 min      (!) EXERCISE CONCERN      7/28/2024   Social Factors   Frequency of gathering with friends or relatives Three times a week   Worry food won't last until get money to buy more No   Food not last or not have enough money for food? No   Do you have housing? (Housing is defined as stable permanent housing and does not include staying ouside in a car, in a tent, in an abandoned building, in an overnight shelter, or couch-surfing.) Yes   Are you worried about losing your housing? No   Lack of transportation? No   Unable to get utilities (heat,electricity)? No            7/28/2024   Fall Risk   Fallen 2 or more times in the past year? No    No   Trouble with walking or balance? No    No       Multiple values from one day are sorted in reverse-chronological order          7/28/2024   Activities of Daily Living- Home Safety   Needs help with the following daily activites None of the above   Safety concerns in the home Throw rugs in the hallway            7/28/2024   Dental   Dentist two times every year? Yes            7/28/2024   Hearing Screening   Hearing concerns?  None of the above            7/28/2024   Driving Risk Screening   Patient/family members have concerns about driving No            7/28/2024   General Alertness/Fatigue Screening   Have you been more tired than usual lately? No            7/28/2024   Urinary Incontinence Screening   Bothered by leaking urine in past 6 months No            7/28/2024   TB Screening   Were you born outside of the [place]? No              7/28/2024   Substance Use   Alcohol more than 3/day or more than 7/wk Not Applicable   Do you have a current opioid prescription? No   How severe/bad is pain from 1 to 10? 1/10   Do you use any other substances recreationally? (!) OTHER        Social History     Tobacco Use    Smoking status: Never     Passive exposure: Never    Smokeless tobacco: Never   Vaping Use    Vaping status: Never Used   Substance Use Topics    Alcohol use: Not Currently    Drug use: Never        Repeat annual mammogram.        History of abnormal Pap smear: No - age 65 or older with pap indicated due to inadequate prior screening (3 consecutive negative cytology results, 2 consecutive negative cotesting results, or 2 consecutive negative HrHPV test results within 10 years, with the most recent test occurring within the recommended screening interval for the test used)       ASCVD Risk   The 10-year ASCVD risk score (Rancho LORA, et al., 2019) is: 4.5%    Values used to calculate the score:      Age: 68 years      Sex: Female      Is Non- : No      Diabetic: No      Tobacco smoker: No      Systolic Blood Pressure: 115 mmHg      Is BP treated: No      HDL Cholesterol: 66 mg/dL      Total Cholesterol: 182 mg/dL      Reviewed and updated as needed this visit by Provider       Med Hx              BP Readings from Last 3 Encounters:   08/01/24 115/62   05/07/24 110/62   02/01/24 112/72    Wt Readings from Last 3 Encounters:   08/01/24 63.1 kg (139 lb 3.2 oz)   02/01/24 59.9 kg (132 lb)   11/15/23 61.7 kg  (136 lb)                  Recent Labs   Lab Test 02/01/24  1138 07/31/23  1147 01/25/23  1202   LDL 69 80 72   HDL 66 61 64   TRIG 233* 205* 223*   ALT 12 13 14   CR 0.90 1.04* 0.88   GFRESTIMATED 70 59* 73   POTASSIUM 4.1 4.4 4.2   TSH 1.39 1.11 1.07      Current providers sharing in care for this patient include:  Patient Care Team:  Zoë Romero NP as PCP - General (Family Medicine)  Zoë Romero NP as Assigned PCP  SHANNAN Oswald MD as Assigned Surgical Provider    The following health maintenance items are reviewed in Epic and correct as of today:  Health Maintenance   Topic Date Due    MEDICARE ANNUAL WELLNESS VISIT  Never done    COVID-19 Vaccine (7 - 2023-24 season) 02/03/2024    INFLUENZA VACCINE (1) 09/01/2024    LIPID  02/01/2025    TSH W/FREE T4 REFLEX  02/01/2025    FALL RISK ASSESSMENT  08/01/2025    MAMMO SCREENING  11/30/2025    GLUCOSE  02/01/2027    DEXA  03/13/2028    COLORECTAL CANCER SCREENING  01/01/2029    ADVANCE CARE PLANNING  08/01/2029    DTAP/TDAP/TD IMMUNIZATION (3 - Td or Tdap) 03/02/2030    HEPATITIS C SCREENING  Completed    PHQ-2 (once per calendar year)  Completed    Pneumococcal Vaccine: 65+ Years  Completed    ZOSTER IMMUNIZATION  Completed    RSV VACCINE (Pregnancy & 60+)  Completed    IPV IMMUNIZATION  Aged Out    HPV IMMUNIZATION  Aged Out    MENINGITIS IMMUNIZATION  Aged Out    RSV MONOCLONAL ANTIBODY  Aged Out         Review of Systems  CONSTITUTIONAL: NEGATIVE for fever, chills, change in weight  INTEGUMENTARY/SKIN: NEGATIVE for worrisome rashes, moles or lesions  EYES: NEGATIVE for vision changes or irritation  ENT/MOUTH: NEGATIVE for ear, mouth and throat problems  RESP: NEGATIVE for significant cough or SOB  BREAST: NEGATIVE for masses, tenderness or discharge  CV: NEGATIVE for chest pain, palpitations or peripheral edema  GI: NEGATIVE for nausea, abdominal pain, heartburn, or change in bowel habits  : NEGATIVE for frequency, dysuria, or  "hematuria  MUSCULOSKELETAL: NEGATIVE for significant arthralgias or myalgia  NEURO: NEGATIVE for weakness, dizziness or paresthesias  ENDOCRINE: NEGATIVE for temperature intolerance, skin/hair changes  HEME: NEGATIVE for bleeding problems  PSYCHIATRIC: NEGATIVE for changes in mood or affect     Objective    Exam  /62 (BP Location: Left arm, Patient Position: Sitting, Cuff Size: Adult Regular)   Pulse 68   Temp 97.7  F (36.5  C) (Tympanic)   Resp 16   Ht 1.68 m (5' 6.14\")   Wt 63.1 kg (139 lb 3.2 oz)   SpO2 97%   Breastfeeding No   BMI 22.37 kg/m     Estimated body mass index is 22.37 kg/m  as calculated from the following:    Height as of this encounter: 1.68 m (5' 6.14\").    Weight as of this encounter: 63.1 kg (139 lb 3.2 oz).    Physical Exam  GENERAL: alert and no distress  HENT: ear canals and TM's normal, nose and mouth without ulcers or lesions  NECK: no adenopathy, no asymmetry, masses, or scars  RESP: lungs clear to auscultation - no rales, rhonchi or wheezes  CV: regular rate and rhythm, normal S1 S2, no S3 or S4, no murmur, pedal pulses intact.    MS: no gross musculoskeletal defects noted, no edema  PSYCH: mentation appears normal, affect normal/bright        8/1/2024   Mini Cog   Clock Draw Score 2 Normal   3 Item Recall 3 objects recalled   Mini Cog Total Score 5              8/1/2024   Vision Screen   Reason Vision Screen Not Completed Patient had exam in last 12 months          Signed Electronically by: Zoë Romero NP    "

## 2024-10-27 ENCOUNTER — MYC REFILL (OUTPATIENT)
Dept: FAMILY MEDICINE | Facility: OTHER | Age: 69
End: 2024-10-27

## 2024-10-27 DIAGNOSIS — N95.1 MENOPAUSAL SYNDROME (HOT FLASHES): ICD-10-CM

## 2024-10-28 RX ORDER — PROGESTERONE 200 MG/1
CAPSULE ORAL
Qty: 90 CAPSULE | Refills: 1 | Status: SHIPPED | OUTPATIENT
Start: 2024-10-28

## 2024-10-28 NOTE — TELEPHONE ENCOUNTER
progesterone (PROMETRIUM) 200 MG capsule 90 capsule 1 5/1/2024     Last Office Visit: 08/01/2024  Future Office visit:       Routing refill request to provider for review/approval because:

## 2024-11-05 DIAGNOSIS — N95.1 MENOPAUSAL SYNDROME (HOT FLASHES): ICD-10-CM

## 2024-11-05 NOTE — TELEPHONE ENCOUNTER
estradiol (ESTRACE) 1 MG tablet 90 tablet 1 5/1/2024   Last Office Visit: 08/01/2024     Future Office visit:    Next 5 appointments (look out 90 days)      Feb 03, 2025 11:00 AM  (Arrive by 10:45 AM)  Provider Visit with Zoë Romero NP  [facility] ([facility] ) [address]  [facility] [address]  [phone]             Routing refill request to provider for review/approval because:

## 2024-11-06 RX ORDER — ESTRADIOL 1 MG/1
1 TABLET ORAL DAILY
Qty: 90 TABLET | Refills: 2 | Status: SHIPPED | OUTPATIENT
Start: 2024-11-06

## 2024-11-06 NOTE — TELEPHONE ENCOUNTER
Contraceptives Protocol [id number]/05/2024 08:47 AM   Protocol Details Medication indicated for associated diagnosis

## 2024-12-03 ENCOUNTER — TRANSFERRED RECORDS (OUTPATIENT)
Dept: HEALTH INFORMATION MANAGEMENT | Facility: CLINIC | Age: 69
End: 2024-12-03
Payer: MEDICARE

## 2024-12-16 DIAGNOSIS — E03.4 HYPOTHYROIDISM DUE TO ACQUIRED ATROPHY OF THYROID: ICD-10-CM

## 2024-12-16 RX ORDER — LEVOTHYROXINE SODIUM 75 UG/1
75 TABLET ORAL DAILY
Qty: 90 TABLET | Refills: 1 | Status: SHIPPED | OUTPATIENT
Start: 2024-12-16

## 2025-01-15 DIAGNOSIS — G25.81 RESTLESS LEGS SYNDROME: ICD-10-CM

## 2025-01-15 RX ORDER — PRAMIPEXOLE DIHYDROCHLORIDE 0.5 MG/1
0.5 TABLET ORAL AT BEDTIME
Qty: 90 TABLET | Refills: 1 | Status: SHIPPED | OUTPATIENT
Start: 2025-01-15

## 2025-01-23 DIAGNOSIS — E78.5 HYPERLIPIDEMIA LDL GOAL <100: ICD-10-CM

## 2025-01-23 RX ORDER — SIMVASTATIN 20 MG
20 TABLET ORAL AT BEDTIME
Qty: 90 TABLET | Refills: 1 | Status: SHIPPED | OUTPATIENT
Start: 2025-01-23

## 2025-01-23 NOTE — PROGRESS NOTES
Assessment & Plan     1. Hyperlipidemia LDL goal <100 (Primary)  Continue zocor  - Lipid Profile; Future  - Comprehensive metabolic panel; Future    2. Hypothyroidism due to acquired atrophy of thyroid  Continue levothyroxine.    - TSH with free T4 reflex; Future    3. Restless legs syndrome  Continue pramipexole     4. Menopausal syndrome (hot flashes)  Stable     5. On statin therapy  - Comprehensive metabolic panel; Future        Return in about 6 months (around 8/3/2025) for hypertension and lipids.      The longitudinal plan of care for the diagnosis(es)/condition(s) as documented were addressed during this visit. Due to the added complexity in care, I will continue to support Blayne in the subsequent management and with ongoing continuity of care.    Zoë Romero,   Certified Adult Nurse Practitioner  [phone]      Subjective   Blayne is a 69 year old, presenting for the following health issues:  Lipids and Thyroid Problem        2/3/2025    10:47 AM   Additional Questions   Roomed by ycrus   Accompanied by none     HPI     Hyperlipidemia Follow-Up    Are you regularly taking any medication or supplement to lower your cholesterol?   Yes- Zocor 200 mg  Are you having muscle aches or other side effects that you think could be caused by your cholesterol lowering medication?  No    Hypothyroidism Follow-up    Since last visit, patient describes the following symptoms: Weight stable, no hair loss, no skin changes, no constipation, no loose stools      Other chronic conditions are stable.  She is feeling well, no new concerns.     Recent Labs   Lab Test 08/01/24  1133 02/01/24  1138 07/31/23  1147   LDL 82 69 80   HDL 69 66 61   TRIG 169* 233* 205*   ALT 14 12 13   CR 1.00* 0.90 1.04*   GFRESTIMATED 62 70 59*   POTASSIUM 4.1 4.1 4.4   TSH 2.39 1.39 1.11      BP Readings from Last 3 Encounters:   02/03/25 110/76   08/01/24 115/62   05/07/24 110/62    Wt Readings from Last 3 Encounters:   02/03/25 64.1 kg  "(141 lb 6.4 oz)   08/01/24 63.1 kg (139 lb 3.2 oz)   02/01/24 59.9 kg (132 lb)              Review of Systems  Constitutional, neuro, ENT, endocrine, pulmonary, cardiac, gastrointestinal, genitourinary, musculoskeletal, integument and psychiatric systems are negative, except as otherwise noted.      Objective    /76 (BP Location: Left arm, Patient Position: Chair, Cuff Size: Adult Regular)   Pulse 54   Temp 98.5  F (36.9  C) (Tympanic)   Resp 16   Ht 1.68 m (5' 6.14\")   Wt 64.1 kg (141 lb 6.4 oz)   SpO2 97%   BMI 22.73 kg/m    Body mass index is 22.73 kg/m .  Physical Exam   GENERAL: alert and no distress  NECK: no adenopathy, no asymmetry, masses, or scars, thyroid normal to palpation, and no carotid bruits  RESP: lungs clear to auscultation - no rales, rhonchi or wheezes  CV: regular rate and rhythm, normal S1 S2, no S3 or S4, no murmur  MS: no gross musculoskeletal defects noted, no edema  PSYCH: mentation appears normal, affect normal/bright            Signed Electronically by: Zoë Romero NP    "

## 2025-02-03 ENCOUNTER — OFFICE VISIT (OUTPATIENT)
Dept: FAMILY MEDICINE | Facility: OTHER | Age: 70
End: 2025-02-03
Attending: NURSE PRACTITIONER
Payer: MEDICARE

## 2025-02-03 VITALS
HEIGHT: 66 IN | SYSTOLIC BLOOD PRESSURE: 110 MMHG | RESPIRATION RATE: 16 BRPM | OXYGEN SATURATION: 97 % | DIASTOLIC BLOOD PRESSURE: 76 MMHG | BODY MASS INDEX: 22.73 KG/M2 | TEMPERATURE: 98.5 F | HEART RATE: 54 BPM | WEIGHT: 141.4 LBS

## 2025-02-03 DIAGNOSIS — E03.4 HYPOTHYROIDISM DUE TO ACQUIRED ATROPHY OF THYROID: ICD-10-CM

## 2025-02-03 DIAGNOSIS — G25.81 RESTLESS LEGS SYNDROME: ICD-10-CM

## 2025-02-03 DIAGNOSIS — N95.1 MENOPAUSAL SYNDROME (HOT FLASHES): ICD-10-CM

## 2025-02-03 DIAGNOSIS — E78.5 HYPERLIPIDEMIA LDL GOAL <100: Primary | ICD-10-CM

## 2025-02-03 DIAGNOSIS — Z79.899 ON STATIN THERAPY: ICD-10-CM

## 2025-02-03 LAB
ALBUMIN SERPL BCG-MCNC: 4.5 G/DL (ref 3.5–5.2)
ALP SERPL-CCNC: 62 U/L (ref 40–150)
ALT SERPL W P-5'-P-CCNC: 15 U/L (ref 0–50)
ANION GAP SERPL CALCULATED.3IONS-SCNC: 12 MMOL/L (ref 7–15)
AST SERPL W P-5'-P-CCNC: 24 U/L (ref 0–45)
BILIRUB SERPL-MCNC: 0.6 MG/DL
BUN SERPL-MCNC: 19.4 MG/DL (ref 8–23)
CALCIUM SERPL-MCNC: 9.9 MG/DL (ref 8.8–10.4)
CHLORIDE SERPL-SCNC: 102 MMOL/L (ref 98–107)
CHOLEST SERPL-MCNC: 174 MG/DL
CREAT SERPL-MCNC: 1.04 MG/DL (ref 0.51–0.95)
EGFRCR SERPLBLD CKD-EPI 2021: 59 ML/MIN/1.73M2
FASTING STATUS PATIENT QL REPORTED: YES
FASTING STATUS PATIENT QL REPORTED: YES
GLUCOSE SERPL-MCNC: 94 MG/DL (ref 70–99)
HCO3 SERPL-SCNC: 26 MMOL/L (ref 22–29)
HDLC SERPL-MCNC: 67 MG/DL
HOLD SPECIMEN: NORMAL
LDLC SERPL CALC-MCNC: 71 MG/DL
NONHDLC SERPL-MCNC: 107 MG/DL
POTASSIUM SERPL-SCNC: 4.3 MMOL/L (ref 3.4–5.3)
PROT SERPL-MCNC: 7.4 G/DL (ref 6.4–8.3)
SODIUM SERPL-SCNC: 140 MMOL/L (ref 135–145)
TRIGL SERPL-MCNC: 178 MG/DL
TSH SERPL DL<=0.005 MIU/L-ACNC: 1.73 UIU/ML (ref 0.3–4.2)

## 2025-02-03 PROCEDURE — 84443 ASSAY THYROID STIM HORMONE: CPT | Mod: ZL | Performed by: NURSE PRACTITIONER

## 2025-02-03 PROCEDURE — 80053 COMPREHEN METABOLIC PANEL: CPT | Mod: ZL | Performed by: NURSE PRACTITIONER

## 2025-02-03 PROCEDURE — 36415 COLL VENOUS BLD VENIPUNCTURE: CPT | Mod: ZL | Performed by: NURSE PRACTITIONER

## 2025-02-03 PROCEDURE — G0463 HOSPITAL OUTPT CLINIC VISIT: HCPCS

## 2025-02-03 PROCEDURE — 82465 ASSAY BLD/SERUM CHOLESTEROL: CPT | Mod: ZL | Performed by: NURSE PRACTITIONER

## 2025-02-03 ASSESSMENT — PAIN SCALES - GENERAL: PAINLEVEL_OUTOF10: NO PAIN (0)

## 2025-02-03 NOTE — PATIENT INSTRUCTIONS
Assessment & Plan     1. Hyperlipidemia LDL goal <100 (Primary)  Continue zocor  - Lipid Profile; Future  - Comprehensive metabolic panel; Future    2. Hypothyroidism due to acquired atrophy of thyroid  Continue levothyroxine.    - TSH with free T4 reflex; Future    3. Restless legs syndrome  Continue pramipexole     4. Menopausal syndrome (hot flashes)  Stable     5. On statin therapy  - Comprehensive metabolic panel; Future        Return in about 6 months (around 8/3/2025) for hypertension and lipids.    Zoë Romero,   Certified Adult Nurse Practitioner  [phone]

## 2025-03-31 ENCOUNTER — OFFICE VISIT (OUTPATIENT)
Dept: DERMATOLOGY | Facility: OTHER | Age: 70
End: 2025-03-31
Attending: DERMATOLOGY
Payer: MEDICARE

## 2025-03-31 VITALS
HEIGHT: 68 IN | DIASTOLIC BLOOD PRESSURE: 62 MMHG | WEIGHT: 140 LBS | OXYGEN SATURATION: 98 % | HEART RATE: 73 BPM | BODY MASS INDEX: 21.22 KG/M2 | SYSTOLIC BLOOD PRESSURE: 106 MMHG

## 2025-03-31 DIAGNOSIS — L57.0 ACTINIC KERATOSIS: ICD-10-CM

## 2025-03-31 DIAGNOSIS — C44.219 BASAL CELL CARCINOMA (BCC) OF SKIN OF LEFT EAR: Primary | ICD-10-CM

## 2025-03-31 PROCEDURE — G0463 HOSPITAL OUTPT CLINIC VISIT: HCPCS

## 2025-03-31 ASSESSMENT — PAIN SCALES - GENERAL: PAINLEVEL_OUTOF10: NO PAIN (0)

## 2025-03-31 NOTE — LETTER
3/31/2025       RE: Sydni Hodgson  [address] [facility] [address]     Dear Colleague,    Thank you for referring your patient, Sydni Hodgson, to the [facility]. Please see a copy of my visit note below.    S: Follow-up visit for Blayne who was seen by myself in the spring of last year and ended up having a basal cell on her ear that was treated later by Mohs surgery in [place].  She has seen today for general follow-up.  She states she has a tiny lesion on her nose that we addressed on her first visit for which she use 5-FU but it went away and now comes back.    O: Exam of the left ear region shows complete healing and to me a very good result and no evidence of persistent tumor.  The lesion on her nose is a less than 1 mm very tiny macule.  I cannot sense any thickening of the dermis.  I suspect this is still an actinic keratosis but the fact that she states it bleeds once in a while remains a concern.    A: Resolved basal cell left ear.  Concerning lesion on the tiny lesion on the tip of the nose.    P: Cryotherapy to the nasal lesion.  Return in a few months for a complete skin exam when her insurance will cover it.Total time spent in preparing to see the patient, review of the chart for past visits to me and recent visits to other practitioners, obtaining interval history since the last visit to me, performing a physical exam of the skin, reviewing treatment options, education, and documenting the above in Epic/EMR was 10 minutes. All time involved was spent on the day of service for the patient.     Again, thank you for allowing me to participate in the care of your patient.      Sincerely,    SHANNAN Oswald MD

## 2025-03-31 NOTE — PROGRESS NOTES
S: Follow-up visit for Blayne who was seen by myself in the spring of last year and ended up having a basal cell on her ear that was treated later by Mohs surgery in [place].  She has seen today for general follow-up.  She states she has a tiny lesion on her nose that we addressed on her first visit for which she use 5-FU but it went away and now comes back.    O: Exam of the left ear region shows complete healing and to me a very good result and no evidence of persistent tumor.  The lesion on her nose is a less than 1 mm very tiny macule.  I cannot sense any thickening of the dermis.  I suspect this is still an actinic keratosis but the fact that she states it bleeds once in a while remains a concern.    A: Resolved basal cell left ear.  Concerning lesion on the tiny lesion on the tip of the nose.    P: Cryotherapy to the nasal lesion.  Return in a few months for a complete skin exam when her insurance will cover it.Total time spent in preparing to see the patient, review of the chart for past visits to me and recent visits to other practitioners, obtaining interval history since the last visit to me, performing a physical exam of the skin, reviewing treatment options, education, and documenting the above in Epic/EMR was 10 minutes. All time involved was spent on the day of service for the patient.

## 2025-04-23 DIAGNOSIS — N95.1 MENOPAUSAL SYNDROME (HOT FLASHES): ICD-10-CM

## 2025-04-23 RX ORDER — PROGESTERONE 200 MG/1
CAPSULE ORAL
Qty: 90 CAPSULE | Refills: 1 | Status: SHIPPED | OUTPATIENT
Start: 2025-04-23

## 2025-06-19 DIAGNOSIS — E03.4 HYPOTHYROIDISM DUE TO ACQUIRED ATROPHY OF THYROID: ICD-10-CM

## 2025-06-19 RX ORDER — LEVOTHYROXINE SODIUM 75 UG/1
75 TABLET ORAL DAILY
Qty: 90 TABLET | Refills: 1 | Status: SHIPPED | OUTPATIENT
Start: 2025-06-19

## 2025-07-12 DIAGNOSIS — G25.81 RESTLESS LEGS SYNDROME: ICD-10-CM

## 2025-07-14 RX ORDER — PRAMIPEXOLE DIHYDROCHLORIDE 0.5 MG/1
0.5 TABLET ORAL AT BEDTIME
Qty: 90 TABLET | Refills: 1 | Status: SHIPPED | OUTPATIENT
Start: 2025-07-14

## 2025-07-17 DIAGNOSIS — N95.1 MENOPAUSAL SYNDROME (HOT FLASHES): ICD-10-CM

## 2025-07-17 RX ORDER — ESTRADIOL 1 MG/1
1 TABLET ORAL DAILY
Qty: 90 TABLET | Refills: 1 | Status: SHIPPED | OUTPATIENT
Start: 2025-07-17

## 2025-07-19 DIAGNOSIS — E78.5 HYPERLIPIDEMIA LDL GOAL <100: ICD-10-CM

## 2025-07-21 RX ORDER — SIMVASTATIN 20 MG
20 TABLET ORAL AT BEDTIME
Qty: 90 TABLET | Refills: 1 | Status: SHIPPED | OUTPATIENT
Start: 2025-07-21

## 2025-08-04 ENCOUNTER — OFFICE VISIT (OUTPATIENT)
Dept: FAMILY MEDICINE | Facility: OTHER | Age: 70
End: 2025-08-04
Attending: NURSE PRACTITIONER
Payer: MEDICARE

## 2025-08-04 VITALS
WEIGHT: 139.5 LBS | OXYGEN SATURATION: 98 % | SYSTOLIC BLOOD PRESSURE: 112 MMHG | HEART RATE: 67 BPM | DIASTOLIC BLOOD PRESSURE: 70 MMHG | BODY MASS INDEX: 21.21 KG/M2 | TEMPERATURE: 98 F

## 2025-08-04 DIAGNOSIS — E16.2 HYPOGLYCEMIA: ICD-10-CM

## 2025-08-04 DIAGNOSIS — G25.81 RESTLESS LEGS SYNDROME: ICD-10-CM

## 2025-08-04 DIAGNOSIS — E03.4 HYPOTHYROIDISM DUE TO ACQUIRED ATROPHY OF THYROID: ICD-10-CM

## 2025-08-04 DIAGNOSIS — M70.61 TROCHANTERIC BURSITIS OF RIGHT HIP: ICD-10-CM

## 2025-08-04 DIAGNOSIS — M48.02 SPINAL STENOSIS IN CERVICAL REGION: ICD-10-CM

## 2025-08-04 DIAGNOSIS — Z79.899 ON STATIN THERAPY: ICD-10-CM

## 2025-08-04 DIAGNOSIS — E78.5 HYPERLIPIDEMIA LDL GOAL <100: Primary | ICD-10-CM

## 2025-08-04 LAB
ALBUMIN SERPL BCG-MCNC: 4.5 G/DL (ref 3.5–5.2)
ALP SERPL-CCNC: 61 U/L (ref 40–150)
ALT SERPL W P-5'-P-CCNC: 12 U/L (ref 0–50)
ANION GAP SERPL CALCULATED.3IONS-SCNC: 11 MMOL/L (ref 7–15)
AST SERPL W P-5'-P-CCNC: 26 U/L (ref 0–45)
BASOPHILS # BLD AUTO: 0 10E3/UL (ref 0–0.2)
BASOPHILS NFR BLD AUTO: 0 %
BILIRUB SERPL-MCNC: 0.6 MG/DL
BUN SERPL-MCNC: 18.6 MG/DL (ref 8–23)
CALCIUM SERPL-MCNC: 10 MG/DL (ref 8.8–10.4)
CHLORIDE SERPL-SCNC: 102 MMOL/L (ref 98–107)
CHOLEST SERPL-MCNC: 181 MG/DL
CREAT SERPL-MCNC: 0.96 MG/DL (ref 0.51–0.95)
EGFRCR SERPLBLD CKD-EPI 2021: 65 ML/MIN/1.73M2
EOSINOPHIL # BLD AUTO: 0.1 10E3/UL (ref 0–0.7)
EOSINOPHIL NFR BLD AUTO: 2 %
ERYTHROCYTE [DISTWIDTH] IN BLOOD BY AUTOMATED COUNT: 11.7 % (ref 10–15)
EST. AVERAGE GLUCOSE BLD GHB EST-MCNC: 103 MG/DL
FASTING STATUS PATIENT QL REPORTED: YES
FASTING STATUS PATIENT QL REPORTED: YES
GLUCOSE SERPL-MCNC: 94 MG/DL (ref 70–99)
HBA1C MFR BLD: 5.2 %
HCO3 SERPL-SCNC: 26 MMOL/L (ref 22–29)
HCT VFR BLD AUTO: 44.9 % (ref 35–47)
HDLC SERPL-MCNC: 62 MG/DL
HGB BLD-MCNC: 15.2 G/DL (ref 11.7–15.7)
IMM GRANULOCYTES # BLD: 0 10E3/UL
IMM GRANULOCYTES NFR BLD: 0 %
LDLC SERPL CALC-MCNC: 79 MG/DL
LYMPHOCYTES # BLD AUTO: 1.9 10E3/UL (ref 0.8–5.3)
LYMPHOCYTES NFR BLD AUTO: 36 %
MCH RBC QN AUTO: 29.7 PG (ref 26.5–33)
MCHC RBC AUTO-ENTMCNC: 33.9 G/DL (ref 31.5–36.5)
MCV RBC AUTO: 88 FL (ref 78–100)
MONOCYTES # BLD AUTO: 0.4 10E3/UL (ref 0–1.3)
MONOCYTES NFR BLD AUTO: 7 %
NEUTROPHILS # BLD AUTO: 3 10E3/UL (ref 1.6–8.3)
NEUTROPHILS NFR BLD AUTO: 55 %
NONHDLC SERPL-MCNC: 119 MG/DL
PLATELET # BLD AUTO: 175 10E3/UL (ref 150–450)
POTASSIUM SERPL-SCNC: 4.6 MMOL/L (ref 3.4–5.3)
PROT SERPL-MCNC: 7.1 G/DL (ref 6.4–8.3)
RBC # BLD AUTO: 5.11 10E6/UL (ref 3.8–5.2)
SODIUM SERPL-SCNC: 139 MMOL/L (ref 135–145)
TRIGL SERPL-MCNC: 202 MG/DL
TSH SERPL DL<=0.005 MIU/L-ACNC: 1.87 UIU/ML (ref 0.3–4.2)
WBC # BLD AUTO: 5.4 10E3/UL (ref 4–11)

## 2025-08-04 PROCEDURE — 84443 ASSAY THYROID STIM HORMONE: CPT | Mod: ZL | Performed by: NURSE PRACTITIONER

## 2025-08-04 PROCEDURE — G0463 HOSPITAL OUTPT CLINIC VISIT: HCPCS

## 2025-08-04 PROCEDURE — 83036 HEMOGLOBIN GLYCOSYLATED A1C: CPT | Mod: ZL | Performed by: NURSE PRACTITIONER

## 2025-08-04 PROCEDURE — 85004 AUTOMATED DIFF WBC COUNT: CPT | Mod: ZL | Performed by: NURSE PRACTITIONER

## 2025-08-04 PROCEDURE — 82465 ASSAY BLD/SERUM CHOLESTEROL: CPT | Mod: ZL | Performed by: NURSE PRACTITIONER

## 2025-08-04 PROCEDURE — 36415 COLL VENOUS BLD VENIPUNCTURE: CPT | Mod: ZL | Performed by: NURSE PRACTITIONER

## 2025-08-04 PROCEDURE — 82040 ASSAY OF SERUM ALBUMIN: CPT | Mod: ZL | Performed by: NURSE PRACTITIONER

## 2025-08-04 ASSESSMENT — PAIN SCALES - GENERAL: PAINLEVEL_OUTOF10: NO PAIN (0)
